# Patient Record
Sex: FEMALE | Race: WHITE | URBAN - METROPOLITAN AREA
[De-identification: names, ages, dates, MRNs, and addresses within clinical notes are randomized per-mention and may not be internally consistent; named-entity substitution may affect disease eponyms.]

---

## 2017-06-14 ENCOUNTER — APPOINTMENT (OUTPATIENT)
Dept: GENERAL RADIOLOGY | Facility: CLINIC | Age: 22
DRG: 639 | End: 2017-06-14
Attending: EMERGENCY MEDICINE
Payer: COMMERCIAL

## 2017-06-14 ENCOUNTER — HOSPITAL ENCOUNTER (INPATIENT)
Facility: CLINIC | Age: 22
LOS: 3 days | Discharge: HOME | DRG: 639 | End: 2017-06-17
Attending: EMERGENCY MEDICINE | Admitting: INTERNAL MEDICINE
Payer: COMMERCIAL

## 2017-06-14 DIAGNOSIS — E11.10 DKA (DIABETIC KETOACIDOSES): Primary | ICD-10-CM

## 2017-06-14 DIAGNOSIS — E13.10 DIABETIC KETOACIDOSIS WITHOUT COMA ASSOCIATED WITH OTHER SPECIFIED DIABETES MELLITUS (H): ICD-10-CM

## 2017-06-14 LAB
ALBUMIN SERPL-MCNC: 4.3 G/DL (ref 3.4–5)
ALP SERPL-CCNC: 220 U/L (ref 40–150)
ALT SERPL W P-5'-P-CCNC: 15 U/L (ref 0–50)
ANION GAP SERPL CALCULATED.3IONS-SCNC: 21 MMOL/L (ref 3–14)
ANION GAP SERPL CALCULATED.3IONS-SCNC: 22 MMOL/L (ref 3–14)
AST SERPL W P-5'-P-CCNC: 6 U/L (ref 0–45)
BASE DEFICIT BLDV-SCNC: 22 MMOL/L
BASOPHILS # BLD AUTO: 0.1 10E9/L (ref 0–0.2)
BASOPHILS NFR BLD AUTO: 0.8 %
BILIRUB DIRECT SERPL-MCNC: 0.1 MG/DL (ref 0–0.2)
BILIRUB SERPL-MCNC: 0.4 MG/DL (ref 0.2–1.3)
BUN SERPL-MCNC: 3 MG/DL (ref 7–30)
BUN SERPL-MCNC: 4 MG/DL (ref 7–30)
CALCIUM SERPL-MCNC: 7.9 MG/DL (ref 8.5–10.1)
CALCIUM SERPL-MCNC: 8.4 MG/DL (ref 8.5–10.1)
CHLORIDE SERPL-SCNC: 105 MMOL/L (ref 94–109)
CHLORIDE SERPL-SCNC: 116 MMOL/L (ref 94–109)
CO2 SERPL-SCNC: 4 MMOL/L (ref 20–32)
CO2 SERPL-SCNC: 7 MMOL/L (ref 20–32)
CREAT SERPL-MCNC: 0.43 MG/DL (ref 0.52–1.04)
CREAT SERPL-MCNC: 0.5 MG/DL (ref 0.52–1.04)
DIFFERENTIAL METHOD BLD: ABNORMAL
EOSINOPHIL # BLD AUTO: 0.1 10E9/L (ref 0–0.7)
EOSINOPHIL NFR BLD AUTO: 0.7 %
ERYTHROCYTE [DISTWIDTH] IN BLOOD BY AUTOMATED COUNT: 19.9 % (ref 10–15)
GFR SERPL CREATININE-BSD FRML MDRD: ABNORMAL ML/MIN/1.7M2
GFR SERPL CREATININE-BSD FRML MDRD: ABNORMAL ML/MIN/1.7M2
GLUCOSE BLDC GLUCOMTR-MCNC: 203 MG/DL (ref 70–99)
GLUCOSE BLDC GLUCOMTR-MCNC: 240 MG/DL (ref 70–99)
GLUCOSE BLDC GLUCOMTR-MCNC: 313 MG/DL (ref 70–99)
GLUCOSE BLDC GLUCOMTR-MCNC: 341 MG/DL (ref 70–99)
GLUCOSE BLDC GLUCOMTR-MCNC: 376 MG/DL (ref 70–99)
GLUCOSE BLDC GLUCOMTR-MCNC: 429 MG/DL (ref 70–99)
GLUCOSE BLDC GLUCOMTR-MCNC: 444 MG/DL (ref 70–99)
GLUCOSE SERPL-MCNC: 230 MG/DL (ref 70–99)
GLUCOSE SERPL-MCNC: 426 MG/DL (ref 70–99)
HBA1C MFR BLD: 14.1 % (ref 4.3–6)
HCG SERPL QL: NEGATIVE
HCO3 BLDV-SCNC: 7 MMOL/L (ref 21–28)
HCT VFR BLD AUTO: 45.6 % (ref 35–47)
HGB BLD-MCNC: 14.1 G/DL (ref 11.7–15.7)
IMM GRANULOCYTES # BLD: 0.1 10E9/L (ref 0–0.4)
IMM GRANULOCYTES NFR BLD: 0.7 %
KETONES BLD-SCNC: 5.4 MMOL/L (ref 0–0.6)
LIPASE SERPL-CCNC: 155 U/L (ref 73–393)
LYMPHOCYTES # BLD AUTO: 3.4 10E9/L (ref 0.8–5.3)
LYMPHOCYTES NFR BLD AUTO: 25.2 %
MAGNESIUM SERPL-MCNC: 2.2 MG/DL (ref 1.6–2.3)
MCH RBC QN AUTO: 22.7 PG (ref 26.5–33)
MCHC RBC AUTO-ENTMCNC: 30.9 G/DL (ref 31.5–36.5)
MCV RBC AUTO: 73 FL (ref 78–100)
MONOCYTES # BLD AUTO: 1.4 10E9/L (ref 0–1.3)
MONOCYTES NFR BLD AUTO: 10.2 %
NEUTROPHILS # BLD AUTO: 8.3 10E9/L (ref 1.6–8.3)
NEUTROPHILS NFR BLD AUTO: 62.4 %
NRBC # BLD AUTO: 0 10*3/UL
NRBC BLD AUTO-RTO: 0 /100
O2/TOTAL GAS SETTING VFR VENT: ABNORMAL %
OXYHGB MFR BLDV: 45 %
PCO2 BLDV: 23 MM HG (ref 40–50)
PH BLDV: 7.06 PH (ref 7.32–7.43)
PHOSPHATE SERPL-MCNC: 2 MG/DL (ref 2.5–4.5)
PLATELET # BLD AUTO: 343 10E9/L (ref 150–450)
PO2 BLDV: 25 MM HG (ref 25–47)
POTASSIUM SERPL-SCNC: 4.2 MMOL/L (ref 3.4–5.3)
POTASSIUM SERPL-SCNC: 4.2 MMOL/L (ref 3.4–5.3)
PROT SERPL-MCNC: 8.3 G/DL (ref 6.8–8.8)
RBC # BLD AUTO: 6.22 10E12/L (ref 3.8–5.2)
SODIUM SERPL-SCNC: 134 MMOL/L (ref 133–144)
SODIUM SERPL-SCNC: 141 MMOL/L (ref 133–144)
TSH SERPL DL<=0.005 MIU/L-ACNC: 1.7 MU/L (ref 0.4–4)
WBC # BLD AUTO: 13.4 10E9/L (ref 4–11)

## 2017-06-14 PROCEDURE — 99223 1ST HOSP IP/OBS HIGH 75: CPT | Mod: AI | Performed by: INTERNAL MEDICINE

## 2017-06-14 PROCEDURE — 82010 KETONE BODYS QUAN: CPT | Performed by: EMERGENCY MEDICINE

## 2017-06-14 PROCEDURE — 84100 ASSAY OF PHOSPHORUS: CPT | Performed by: EMERGENCY MEDICINE

## 2017-06-14 PROCEDURE — 83735 ASSAY OF MAGNESIUM: CPT | Performed by: EMERGENCY MEDICINE

## 2017-06-14 PROCEDURE — 96365 THER/PROPH/DIAG IV INF INIT: CPT

## 2017-06-14 PROCEDURE — 00000146 ZZHCL STATISTIC GLUCOSE BY METER IP

## 2017-06-14 PROCEDURE — 82805 BLOOD GASES W/O2 SATURATION: CPT | Performed by: EMERGENCY MEDICINE

## 2017-06-14 PROCEDURE — 25000128 H RX IP 250 OP 636: Performed by: INTERNAL MEDICINE

## 2017-06-14 PROCEDURE — 40000275 ZZH STATISTIC RCP TIME EA 10 MIN

## 2017-06-14 PROCEDURE — 25800025 ZZH RX 258: Performed by: INTERNAL MEDICINE

## 2017-06-14 PROCEDURE — 80048 BASIC METABOLIC PNL TOTAL CA: CPT | Performed by: EMERGENCY MEDICINE

## 2017-06-14 PROCEDURE — 84443 ASSAY THYROID STIM HORMONE: CPT | Performed by: EMERGENCY MEDICINE

## 2017-06-14 PROCEDURE — 85025 COMPLETE CBC W/AUTO DIFF WBC: CPT | Performed by: EMERGENCY MEDICINE

## 2017-06-14 PROCEDURE — 36416 COLLJ CAPILLARY BLOOD SPEC: CPT | Performed by: INTERNAL MEDICINE

## 2017-06-14 PROCEDURE — 96361 HYDRATE IV INFUSION ADD-ON: CPT

## 2017-06-14 PROCEDURE — 20000003 ZZH R&B ICU

## 2017-06-14 PROCEDURE — 71020 XR CHEST 2 VW: CPT

## 2017-06-14 PROCEDURE — 93005 ELECTROCARDIOGRAM TRACING: CPT

## 2017-06-14 PROCEDURE — 99207 ZZC CDG-CODE CATEGORY CHANGED: CPT | Performed by: INTERNAL MEDICINE

## 2017-06-14 PROCEDURE — 83690 ASSAY OF LIPASE: CPT | Performed by: EMERGENCY MEDICINE

## 2017-06-14 PROCEDURE — 82010 KETONE BODYS QUAN: CPT | Performed by: INTERNAL MEDICINE

## 2017-06-14 PROCEDURE — 84703 CHORIONIC GONADOTROPIN ASSAY: CPT | Performed by: EMERGENCY MEDICINE

## 2017-06-14 PROCEDURE — 87640 STAPH A DNA AMP PROBE: CPT | Performed by: INTERNAL MEDICINE

## 2017-06-14 PROCEDURE — 99285 EMERGENCY DEPT VISIT HI MDM: CPT | Mod: 25

## 2017-06-14 PROCEDURE — 25000131 ZZH RX MED GY IP 250 OP 636 PS 637: Performed by: EMERGENCY MEDICINE

## 2017-06-14 PROCEDURE — 83036 HEMOGLOBIN GLYCOSYLATED A1C: CPT | Performed by: EMERGENCY MEDICINE

## 2017-06-14 PROCEDURE — 82805 BLOOD GASES W/O2 SATURATION: CPT | Performed by: INTERNAL MEDICINE

## 2017-06-14 PROCEDURE — 25000128 H RX IP 250 OP 636: Performed by: EMERGENCY MEDICINE

## 2017-06-14 PROCEDURE — 36600 WITHDRAWAL OF ARTERIAL BLOOD: CPT

## 2017-06-14 PROCEDURE — 80048 BASIC METABOLIC PNL TOTAL CA: CPT | Performed by: INTERNAL MEDICINE

## 2017-06-14 PROCEDURE — 96376 TX/PRO/DX INJ SAME DRUG ADON: CPT

## 2017-06-14 PROCEDURE — 80076 HEPATIC FUNCTION PANEL: CPT | Performed by: EMERGENCY MEDICINE

## 2017-06-14 PROCEDURE — 25000125 ZZHC RX 250: Performed by: INTERNAL MEDICINE

## 2017-06-14 PROCEDURE — 25000132 ZZH RX MED GY IP 250 OP 250 PS 637: Performed by: INTERNAL MEDICINE

## 2017-06-14 PROCEDURE — 87641 MR-STAPH DNA AMP PROBE: CPT | Performed by: INTERNAL MEDICINE

## 2017-06-14 RX ORDER — ONDANSETRON 4 MG/1
4 TABLET, ORALLY DISINTEGRATING ORAL EVERY 6 HOURS PRN
Status: DISCONTINUED | OUTPATIENT
Start: 2017-06-14 | End: 2017-06-17 | Stop reason: HOSPADM

## 2017-06-14 RX ORDER — DEXTROSE MONOHYDRATE, SODIUM CHLORIDE, AND POTASSIUM CHLORIDE 50; 1.49; 4.5 G/1000ML; G/1000ML; G/1000ML
INJECTION, SOLUTION INTRAVENOUS CONTINUOUS
Status: DISCONTINUED | OUTPATIENT
Start: 2017-06-14 | End: 2017-06-16

## 2017-06-14 RX ORDER — POTASSIUM CHLORIDE 1.5 G/1.58G
20-40 POWDER, FOR SOLUTION ORAL
Status: DISCONTINUED | OUTPATIENT
Start: 2017-06-14 | End: 2017-06-17 | Stop reason: HOSPADM

## 2017-06-14 RX ORDER — PROCHLORPERAZINE 25 MG
25 SUPPOSITORY, RECTAL RECTAL EVERY 12 HOURS PRN
Status: DISCONTINUED | OUTPATIENT
Start: 2017-06-14 | End: 2017-06-17 | Stop reason: HOSPADM

## 2017-06-14 RX ORDER — NICOTINE POLACRILEX 4 MG
15-30 LOZENGE BUCCAL
Status: DISCONTINUED | OUTPATIENT
Start: 2017-06-14 | End: 2017-06-17 | Stop reason: HOSPADM

## 2017-06-14 RX ORDER — MAGNESIUM SULFATE HEPTAHYDRATE 40 MG/ML
4 INJECTION, SOLUTION INTRAVENOUS EVERY 4 HOURS PRN
Status: DISCONTINUED | OUTPATIENT
Start: 2017-06-14 | End: 2017-06-17 | Stop reason: HOSPADM

## 2017-06-14 RX ORDER — POTASSIUM CL/LIDO/0.9 % NACL 10MEQ/0.1L
10 INTRAVENOUS SOLUTION, PIGGYBACK (ML) INTRAVENOUS
Status: DISCONTINUED | OUTPATIENT
Start: 2017-06-14 | End: 2017-06-17 | Stop reason: HOSPADM

## 2017-06-14 RX ORDER — DEXTROSE MONOHYDRATE 25 G/50ML
25-50 INJECTION, SOLUTION INTRAVENOUS
Status: DISCONTINUED | OUTPATIENT
Start: 2017-06-14 | End: 2017-06-17 | Stop reason: HOSPADM

## 2017-06-14 RX ORDER — POTASSIUM CHLORIDE 1500 MG/1
20-40 TABLET, EXTENDED RELEASE ORAL
Status: DISCONTINUED | OUTPATIENT
Start: 2017-06-14 | End: 2017-06-17 | Stop reason: HOSPADM

## 2017-06-14 RX ORDER — PROCHLORPERAZINE MALEATE 5 MG
5-10 TABLET ORAL EVERY 6 HOURS PRN
Status: DISCONTINUED | OUTPATIENT
Start: 2017-06-14 | End: 2017-06-17 | Stop reason: HOSPADM

## 2017-06-14 RX ORDER — LORAZEPAM 0.5 MG/1
0.25 TABLET ORAL ONCE
Status: COMPLETED | OUTPATIENT
Start: 2017-06-14 | End: 2017-06-14

## 2017-06-14 RX ORDER — ONDANSETRON 2 MG/ML
4 INJECTION INTRAMUSCULAR; INTRAVENOUS EVERY 6 HOURS PRN
Status: DISCONTINUED | OUTPATIENT
Start: 2017-06-14 | End: 2017-06-17 | Stop reason: HOSPADM

## 2017-06-14 RX ORDER — POLYETHYLENE GLYCOL 3350 17 G/17G
17 POWDER, FOR SOLUTION ORAL DAILY PRN
Status: DISCONTINUED | OUTPATIENT
Start: 2017-06-14 | End: 2017-06-17 | Stop reason: HOSPADM

## 2017-06-14 RX ORDER — AMOXICILLIN 250 MG
1-2 CAPSULE ORAL 2 TIMES DAILY PRN
Status: DISCONTINUED | OUTPATIENT
Start: 2017-06-14 | End: 2017-06-17 | Stop reason: HOSPADM

## 2017-06-14 RX ORDER — SODIUM CHLORIDE AND POTASSIUM CHLORIDE 150; 450 MG/100ML; MG/100ML
INJECTION, SOLUTION INTRAVENOUS CONTINUOUS
Status: DISCONTINUED | OUTPATIENT
Start: 2017-06-14 | End: 2017-06-16

## 2017-06-14 RX ORDER — POTASSIUM CHLORIDE 7.45 MG/ML
10 INJECTION INTRAVENOUS
Status: DISCONTINUED | OUTPATIENT
Start: 2017-06-14 | End: 2017-06-17 | Stop reason: HOSPADM

## 2017-06-14 RX ORDER — NALOXONE HYDROCHLORIDE 0.4 MG/ML
.1-.4 INJECTION, SOLUTION INTRAMUSCULAR; INTRAVENOUS; SUBCUTANEOUS
Status: DISCONTINUED | OUTPATIENT
Start: 2017-06-14 | End: 2017-06-17 | Stop reason: HOSPADM

## 2017-06-14 RX ORDER — POTASSIUM CHLORIDE 29.8 MG/ML
20 INJECTION INTRAVENOUS
Status: DISCONTINUED | OUTPATIENT
Start: 2017-06-14 | End: 2017-06-17 | Stop reason: HOSPADM

## 2017-06-14 RX ORDER — ACETAMINOPHEN 325 MG/1
650 TABLET ORAL EVERY 4 HOURS PRN
Status: DISCONTINUED | OUTPATIENT
Start: 2017-06-14 | End: 2017-06-17 | Stop reason: HOSPADM

## 2017-06-14 RX ADMIN — SODIUM CHLORIDE 1000 ML: 9 INJECTION, SOLUTION INTRAVENOUS at 21:39

## 2017-06-14 RX ADMIN — SODIUM CHLORIDE: 9 INJECTION, SOLUTION INTRAVENOUS at 20:55

## 2017-06-14 RX ADMIN — SODIUM CHLORIDE 1000 ML: 9 INJECTION, SOLUTION INTRAVENOUS at 18:59

## 2017-06-14 RX ADMIN — POTASSIUM CHLORIDE, DEXTROSE MONOHYDRATE AND SODIUM CHLORIDE: 150; 5; 450 INJECTION, SOLUTION INTRAVENOUS at 23:11

## 2017-06-14 RX ADMIN — Medication 0.25 MG: at 23:12

## 2017-06-14 RX ADMIN — POTASSIUM PHOSPHATE, MONOBASIC AND POTASSIUM PHOSPHATE, DIBASIC 15 MMOL: 224; 236 INJECTION, SOLUTION, CONCENTRATE INTRAVENOUS at 23:14

## 2017-06-14 RX ADMIN — SODIUM CHLORIDE: 9 INJECTION, SOLUTION INTRAVENOUS at 20:10

## 2017-06-14 ASSESSMENT — ENCOUNTER SYMPTOMS
SHORTNESS OF BREATH: 1
NAUSEA: 1
VOMITING: 1
DIARRHEA: 0
ABDOMINAL PAIN: 0
COUGH: 0
FEVER: 0

## 2017-06-14 NOTE — IP AVS SNAPSHOT
[place] [address] [facility]    [address]    [facility] [address]    Phone:  [phone]    Fax:  [phone]                                       After Visit Summary   6/14/2017    Karely Jacobo    MRN: [id number]           After Visit Summary Signature Page     I have received my discharge instructions, and my questions have been answered. I have discussed any challenges I see with this plan with the nurse or doctor.    ..........................................................................................................................................  Patient/Patient Representative Signature      ..........................................................................................................................................  Patient Representative Print Name and Relationship to Patient    ..................................................               ................................................  Date                                            Time    ..........................................................................................................................................  Reviewed by Signature/Title    ...................................................              ..............................................  Date                                                            Time

## 2017-06-14 NOTE — IP AVS SNAPSHOT
MRN:[id number]                      After Visit Summary   6/14/2017    Karely Jacobo    MRN: [id number]           Thank you!     Thank you for choosing [facility] for your care. Our goal is always to provide you with excellent care. Hearing back from our patients is one way we can continue to improve our services. Please take a few minutes to complete the written survey that you may receive in the mail after you visit. If you would like to speak to someone directly about your visit please contact Patient Relations at [phone]. Thank you!          Patient Information     Date Of Birth          1995        Designated Caregiver       Most Recent Value    Caregiver    Will someone help with your care after discharge? no      About your hospital stay     You were admitted on:  June 14, 2017 You last received care in the:  [place] [address] [facility]    You were discharged on:  June 17, 2017       Who to Call     For medical emergencies, please call 911.  For non-urgent questions about your medical care, please call your primary care provider or clinic, None          Attending Provider     Provider Specialty    Emmanuel Santa MD Emergency Medicine    Bang, Evette Godinez MD Internal Medicine       Primary Care Provider    Physician No Ref-Primary      Follow-up Appointments     Follow-up and recommended labs and tests        F/U with your primary MD on Monday to Tuesday for diabetic check and referral to endocrinology  Check your blood sugars 3 times a day before meals and at bedtime  Try counting your carbs and keep a diary-bring your blood sugar measurements and your carb counting to your follow up appointment  If you start to feel that your blood sugar is low, check your level                  Additional Services     MED THERAPY MANAGE REFERRAL       Your provider has referred you to: **[facility] Scheduling (numerous locations) [phone]    [email or web address]    Reason for Referral: Hgb A1C 14.  New Diabetic    The [facility] department will contact you to schedule an appointment.  You may also schedule the appointment by calling [phone].  For [facility] [place] [facility] [place] patients, please call [phone] to confirm/schedule your appointment on the next business day.    This service is designed to help you get the most from your medications.  A specially trained Pharmacist will work closely with you and your providers to solve any questions, concerns, issues or problems related to your medications.    Please bring all of your prescription and non-prescription medications (such as vitamins, over-the-counter medications, and herbals) or a detailed medication list to your appointment.    If you have a glucose meter or other home monitoring information, please also bring this to your appointment (i.e. blood glucose log, blood pressure log, pain log, etc.).            NUTRITION REFERRAL       Your provider has referred you to: [facility] [facility] [phone]   [email or web address]    Please be aware that coverage of these services is subject to the terms and limitations of your health insurance plan.  Call member services at your health plan with any benefit or coverage questions.      Please bring the following with you to your appointment:    (1) This referral request  (2) Any documents given to you regarding this referral  (3) Any specific questions you have about diet and/or food choices                  Pending Results     Date and Time Order Name Status Description    6/15/2017 0001 Glutamic acid decarboxylase antibody In process             Statement of Approval     Ordered          06/17/17 2135  I have reviewed and agree with all the recommendations and orders detailed in this document.  EFFECTIVE NOW     Approved and  "electronically signed by:  Pauline Gamez MD             Admission Information     Date & Time Provider Department Dept. Phone    2017 Evette Bang MD [place] [address] [facility] [phone]      Your Vitals Were     Blood Pressure Pulse Temperature Respirations Height Weight    115/71 (BP Location: Left arm) 138 98.1  F (36.7  C) (Oral) 18 1.6 m (5' 3\") 66.3 kg (146 lb 1.6 oz)    Pulse Oximetry BMI (Body Mass Index)                100% 25.88 kg/m2          [employer] Information     [employer] lets you send messages to your doctor, view your test results, renew your prescriptions, schedule appointments and more. To sign up, go to [email or web address] . Click on \"Log in\" on the left side of the screen, which will take you to the Welcome page. Then click on \"Sign up Now\" on the right side of the page.     You will be asked to enter the access code listed below, as well as some personal information. Please follow the directions to create your username and password.     Your access code is: [email or web address]  Expires: 9/15/2017  5:58 PM     Your access code will  in 90 days. If you need help or a new code, please call your [place] clinic or [phone].        Care EveryWhere ID     This is your Care EveryWhere ID. This could be used by other organizations to access your [place] medical records  [id number]           Review of your medicines      START taking        Dose / Directions    * insulin aspart 100 UNIT/ML injection   Commonly known as:  NovoLOG FLEXPEN   Used for:  Diabetic ketoacidosis without coma associated with other specified diabetes mellitus (H)   Notes to Patient:  This is rapid-acting insulin.        Dose:  1-5 Units   Inject 1-5 Units Subcutaneous At Bedtime No insulin for BG < 200, 200-250 give 1 u, 251-300: 3 u; 301-350 4 u and notify clinic   Quantity:  30 mL   Refills:  1       * insulin aspart 100 UNIT/ML injection   Commonly known as:  NovoLOG PEN   Used for:  Diabetic " ketoacidosis without coma associated with other specified diabetes mellitus (H)   Notes to Patient:  This is rapid-acting insulin.        Dose:  1-7 Units   Inject 1-7 Units Subcutaneous 3 times daily (with meals) BS <150: no insulin 151-200: 1 u; 201-250: 3 units; 251-300: 4 u; 301-350: 6 u,  > 350 give 7 units and call clinic   Quantity:  3 mL   Refills:  0       insulin glargine 100 UNIT/ML injection   Commonly known as:  LANTUS SOLOSTAR   Used for:  Diabetic ketoacidosis without coma associated with other specified diabetes mellitus (H)   Notes to Patient:  This is long-acting insulin.        Dose:  12 Units   Inject 12 Units Subcutaneous At Bedtime   Quantity:  15 mL   Refills:  1       * Notice:  This list has 2 medication(s) that are the same as other medications prescribed for you. Read the directions carefully, and ask your doctor or other care provider to review them with you.         Where to get your medicines      These medications were sent to [facility] [address] [place] [id number]    Hours:  24-hours Phone:  [phone]     insulin aspart 100 UNIT/ML injection    insulin glargine 100 UNIT/ML injection         Some of these will need a paper prescription and others can be bought over the counter. Ask your nurse if you have questions.     Bring a paper prescription for each of these medications     insulin aspart 100 UNIT/ML injection                Protect others around you: Learn how to safely use, store and throw away your medicines at www.disposemymeds.org.             Medication List: This is a list of all your medications and when to take them. Check marks below indicate your daily home schedule. Keep this list as a reference.      Medications           Morning Afternoon Evening Bedtime As Needed    * insulin aspart 100 UNIT/ML injection   Commonly known as:  NovoLOG FLEXPEN   Inject 1-5 Units  Subcutaneous At Bedtime No insulin for BG < 200, 200-250 give 1 u, 251-300: 3 u; 301-350 4 u and notify clinic   Last time this was given:  7 Units on 6/17/2017  1:50 PM   Next Dose Due:  Check blood sugar tonight before bed and given insulin as indicated.   Notes to Patient:  This is rapid-acting insulin.                                * insulin aspart 100 UNIT/ML injection   Commonly known as:  NovoLOG PEN   Inject 1-7 Units Subcutaneous 3 times daily (with meals) BS <150: no insulin 151-200: 1 u; 201-250: 3 units; 251-300: 4 u; 301-350: 6 u,  > 350 give 7 units and call clinic   Last time this was given:  7 Units on 6/17/2017  1:50 PM   Next Dose Due:  Check blood sugar tonight before supper and give insulin as indicated.   Notes to Patient:  This is rapid-acting insulin.                                insulin glargine 100 UNIT/ML injection   Commonly known as:  LANTUS SOLOSTAR   Inject 12 Units Subcutaneous At Bedtime   Next Dose Due:  Give tonight at bedtime.   Notes to Patient:  This is long-acting insulin.                                * Notice:  This list has 2 medication(s) that are the same as other medications prescribed for you. Read the directions carefully, and ask your doctor or other care provider to review them with you.

## 2017-06-14 NOTE — ED NOTES
Patient comes in with complaints of SOB and nausea and vomiting. Patient was seen in clinic and was sent here for further evaluation for possible DKA per clinic. No complaints of pain.

## 2017-06-14 NOTE — ED PROVIDER NOTES
History     Chief Complaint:  Nausea, vomiting, and shortness of breath    HPI   Karely Jacobo is a 21 year old otherwise healthy male who presents to the emergency department today for evaluation of nausea, vomiting, and shortness of breath. The patient has had vomiting with associated shortness of breath and generalized weakness starting yesterday morning. Spouse notes the patient had 2 episodes of vomiting yesterday as well as one episode today. The patient feels dehydrated as she is unable to keep fluids and PO down. Prior to onset of symptoms, the patient felt normal. The patient visited her clinic and was recommended to come in for further evaluation for glucose and ketone in urine analysis. No personal history of diabetes. No fever or cough. No chest pain. No abdominal pain or diarrhea. Last menstrual period is , which was normal.     Allergies:  NKKA     Medications:    No daily medications.     Past Medical History:    History reviewed. No pertinent medical history.     Past Surgical History:        Family History:    Diabetes    Social History:  Marital Status:    Tobacco: Negative  Alcohol: Negative  Presents to the ED with spouse and son.      Review of Systems   Constitutional: Negative for fever.   Respiratory: Positive for shortness of breath (mild). Negative for cough.    Cardiovascular: Negative for chest pain.   Gastrointestinal: Positive for nausea and vomiting. Negative for abdominal pain and diarrhea.   All other systems reviewed and are negative.  10 point review of systems performed and is negative except as above and in HPI.  Physical Exam   First Vitals:  Patient Vitals for the past 24 hrs:   BP Temp Pulse Heart Rate Resp SpO2   17 127/87 - - - - 100 %   17 - - - - 30 100 %   17 (!) 113/98 - - 110 (!) 32 99 %   17 105/89 - - 101 30 100 %   17 102/87 - - 103 (!) 32 100 %   17 113/83 - - 106 (!) 32 100  %   17 (!) 84/46 - - 120 27 100 %   17 - - - 107 23 100 %   17 1907 (!) 126/94 - - - - 100 %   17 1843 (!) 131/104 98.7  F (37.1  C) 138 138 18 98 %      Physical Exam  Vital signs and nursing notes reviewed.     Constitutional: laying on gurney appears comfortable  HENT:  Dry oral mucosa.   Eyes: Conjunctivae are normal bilaterally. Pupils equal  Neck: normal range of motion  Cardiovascular: Tachycardic, regular rhythm, normal heart sounds.   Pulmonary/Chest:  Lungs are clear without rales, rhonchi, or wheezing. Tachypneic.  Abdominal: Soft. No distention. Bowel sounds are normal.  No significant reproducible abdominal pain.   Musculoskeletal: No joint swelling or edema.   Neurological: Alert and oriented. No focal weakness  Skin: Skin is warm and dry. No rash noted.   Psych: normal affect  Emergency Department Course   EC2017 19:08:05  Indication: rule out cardiac etiology  Findings:    Sinus tachycardia   Possible left atrial enlargement   Borderline ECG..   Ventricular rate: 120 bpm  OR interval: 148 ms  QRS duration: 74 ms  QT/QTc: 324/457 ms  R-Axis: 56  ECG read at 19:10 by Dr. Rai.    Laboratory:  CBC:  WBC 13.4, HGB 14.1,   BMP: Glucose 426, BUN 4, Creatinine  0.50, CO2 7, Anion 22 o/w WNL.   Hepatic panel: Bili Direct 0.1, Bili Total 0.4, Albumin 4.3, Protein Total 8.3, Alkphos 220, ALT 15, AST 6  Blood gas venous and oxyhgb: pH 7.06 PCO2 23 PO2 25 Bicarbonate 7 Oxyhemoglobin 45 Base 22.0   Ketone beta-hydrobutyrate: 5.4  HCG Qualitative Pregnancy: Negative.  Lipase: 155  TSH with free T4 reflex: 1.70  Glucose by meter (18:55:00): 429 (H)    Interventions:  18:59 Normal saline 1,000mL IV   20:10  Insulin 1 unit/1 mL in NS IV    Emergency Department Course:  Nursing notes and vitals reviewed.    18:47 I performed an exam of the patient as documented above.     18:56 Blood drawn. This was sent to the lab for further testing, results above.    18:59 A  peripheral IV was established.    19:08 EKG obtained in the ED, see results above.     19:44 Recheck patient. Findings and plan explained to the Patient and spouse who consents to admission.     20:01 Discussed the patient with Dr. Bang, who will admit the patient to a ICU bed for further monitoring, evaluation, and treatment.    The patient received the intervention(s) above.    Impression & Plan    Medical Decision Making:  Karely Jacobo is a 21 year old female who presents with symptoms of nausea and generalized weakness. She was referred from outpatient clinic due to noted glucose and ketone in her urine.  Patient is not known to have history of any diabetic problems and is otherwise healthy.  On examination, she appeared to be dehydrated and was tachypnic and tachycardic. Her labs showed findings consistent with DKA. She was started on IV fluids and as electrolytes are normal at this time, I did initiate an insulin drip. We will do Q15 blood sugar checks for the first hour to ensure that we do not drop her blood sugar too quickly. There is no indication she has an obvious underlying infectious etiology that would have triggered this. I reviewed the results with the patient and the reasons for admission. She and her  understand the plan. She will be admitted to the ICU for close monitoring and I spoke with Dr. Bang for admission who graciously accepts patient.     Critical Care time:  Was 40 minutes excluding procedures    Diagnosis:    ICD-10-CM   1. Diabetic ketoacidosis without coma associated with other specified diabetes mellitus (H) E13.10       Disposition:  Admitted to ICU bed under the care of Dr. Bang    Scribe Disclosure:  GINGER, Ruap Crandall, am serving as a scribe at 6:47 PM on 6/14/2017 to document services personally performed by Emmanuel Santa MD, based on my observations and the provider's statements to me.  Rupa Crandall  6/14/2017   [facility] EMERGENCY DEPARTMENT        Emmanuel Santa MD  06/14/17 2558

## 2017-06-15 LAB
ANION GAP SERPL CALCULATED.3IONS-SCNC: 10 MMOL/L (ref 3–14)
ANION GAP SERPL CALCULATED.3IONS-SCNC: 12 MMOL/L (ref 3–14)
ANION GAP SERPL CALCULATED.3IONS-SCNC: 13 MMOL/L (ref 3–14)
BASE DEFICIT BLDA-SCNC: NORMAL MMOL/L
BASE DEFICIT BLDV-SCNC: 15.4 MMOL/L
BASE DEFICIT BLDV-SCNC: 20.9 MMOL/L
BASE EXCESS BLDA CALC-SCNC: NORMAL MMOL/L
BUN SERPL-MCNC: 2 MG/DL (ref 7–30)
BUN SERPL-MCNC: 3 MG/DL (ref 7–30)
BUN SERPL-MCNC: 3 MG/DL (ref 7–30)
CALCIUM SERPL-MCNC: 7.7 MG/DL (ref 8.5–10.1)
CALCIUM SERPL-MCNC: 7.9 MG/DL (ref 8.5–10.1)
CALCIUM SERPL-MCNC: 8.1 MG/DL (ref 8.5–10.1)
CHLORIDE SERPL-SCNC: 112 MMOL/L (ref 94–109)
CHLORIDE SERPL-SCNC: 117 MMOL/L (ref 94–109)
CHLORIDE SERPL-SCNC: 120 MMOL/L (ref 94–109)
CO2 SERPL-SCNC: 14 MMOL/L (ref 20–32)
CO2 SERPL-SCNC: 16 MMOL/L (ref 20–32)
CO2 SERPL-SCNC: 8 MMOL/L (ref 20–32)
CREAT SERPL-MCNC: 0.34 MG/DL (ref 0.52–1.04)
CREAT SERPL-MCNC: 0.36 MG/DL (ref 0.52–1.04)
CREAT SERPL-MCNC: 0.46 MG/DL (ref 0.52–1.04)
ERYTHROCYTE [DISTWIDTH] IN BLOOD BY AUTOMATED COUNT: 19 % (ref 10–15)
GFR SERPL CREATININE-BSD FRML MDRD: ABNORMAL ML/MIN/1.7M2
GLUCOSE BLDC GLUCOMTR-MCNC: 113 MG/DL (ref 70–99)
GLUCOSE BLDC GLUCOMTR-MCNC: 126 MG/DL (ref 70–99)
GLUCOSE BLDC GLUCOMTR-MCNC: 136 MG/DL (ref 70–99)
GLUCOSE BLDC GLUCOMTR-MCNC: 140 MG/DL (ref 70–99)
GLUCOSE BLDC GLUCOMTR-MCNC: 146 MG/DL (ref 70–99)
GLUCOSE BLDC GLUCOMTR-MCNC: 148 MG/DL (ref 70–99)
GLUCOSE BLDC GLUCOMTR-MCNC: 154 MG/DL (ref 70–99)
GLUCOSE BLDC GLUCOMTR-MCNC: 154 MG/DL (ref 70–99)
GLUCOSE BLDC GLUCOMTR-MCNC: 157 MG/DL (ref 70–99)
GLUCOSE BLDC GLUCOMTR-MCNC: 165 MG/DL (ref 70–99)
GLUCOSE BLDC GLUCOMTR-MCNC: 176 MG/DL (ref 70–99)
GLUCOSE BLDC GLUCOMTR-MCNC: 177 MG/DL (ref 70–99)
GLUCOSE BLDC GLUCOMTR-MCNC: 182 MG/DL (ref 70–99)
GLUCOSE BLDC GLUCOMTR-MCNC: 188 MG/DL (ref 70–99)
GLUCOSE BLDC GLUCOMTR-MCNC: 189 MG/DL (ref 70–99)
GLUCOSE BLDC GLUCOMTR-MCNC: 189 MG/DL (ref 70–99)
GLUCOSE BLDC GLUCOMTR-MCNC: 203 MG/DL (ref 70–99)
GLUCOSE BLDC GLUCOMTR-MCNC: 206 MG/DL (ref 70–99)
GLUCOSE BLDC GLUCOMTR-MCNC: 206 MG/DL (ref 70–99)
GLUCOSE BLDC GLUCOMTR-MCNC: 212 MG/DL (ref 70–99)
GLUCOSE BLDC GLUCOMTR-MCNC: 221 MG/DL (ref 70–99)
GLUCOSE BLDC GLUCOMTR-MCNC: 263 MG/DL (ref 70–99)
GLUCOSE BLDC GLUCOMTR-MCNC: 264 MG/DL (ref 70–99)
GLUCOSE SERPL-MCNC: 115 MG/DL (ref 70–99)
GLUCOSE SERPL-MCNC: 212 MG/DL (ref 70–99)
GLUCOSE SERPL-MCNC: 219 MG/DL (ref 70–99)
HCO3 BLD-SCNC: NORMAL MMOL/L (ref 21–28)
HCO3 BLDV-SCNC: 11 MMOL/L (ref 21–28)
HCO3 BLDV-SCNC: 7 MMOL/L (ref 21–28)
HCT VFR BLD AUTO: 36.5 % (ref 35–47)
HGB BLD-MCNC: 11.8 G/DL (ref 11.7–15.7)
INTERPRETATION ECG - MUSE: NORMAL
KETONES BLD-SCNC: 3.2 MMOL/L (ref 0–0.6)
KETONES BLD-SCNC: 3.9 MMOL/L (ref 0–0.6)
MAGNESIUM SERPL-MCNC: 1.8 MG/DL (ref 1.6–2.3)
MCH RBC QN AUTO: 22.9 PG (ref 26.5–33)
MCHC RBC AUTO-ENTMCNC: 32.3 G/DL (ref 31.5–36.5)
MCV RBC AUTO: 71 FL (ref 78–100)
MRSA DNA SPEC QL NAA+PROBE: NORMAL
O2/TOTAL GAS SETTING VFR VENT: 2 %
O2/TOTAL GAS SETTING VFR VENT: ABNORMAL %
O2/TOTAL GAS SETTING VFR VENT: NORMAL %
OXYHGB MFR BLD: NORMAL % (ref 92–100)
OXYHGB MFR BLDV: 55 %
OXYHGB MFR BLDV: 73 %
PCO2 BLD: NORMAL MM HG (ref 35–45)
PCO2 BLDV: 23 MM HG (ref 40–50)
PCO2 BLDV: 29 MM HG (ref 40–50)
PH BLD: NORMAL PH (ref 7.35–7.45)
PH BLDV: 7.1 PH (ref 7.32–7.43)
PH BLDV: 7.2 PH (ref 7.32–7.43)
PHOSPHATE SERPL-MCNC: 1 MG/DL (ref 2.5–4.5)
PHOSPHATE SERPL-MCNC: 1.4 MG/DL (ref 2.5–4.5)
PLATELET # BLD AUTO: 248 10E9/L (ref 150–450)
PO2 BLD: NORMAL MM HG (ref 80–105)
PO2 BLDV: 28 MM HG (ref 25–47)
PO2 BLDV: 35 MM HG (ref 25–47)
POTASSIUM SERPL-SCNC: 3.1 MMOL/L (ref 3.4–5.3)
POTASSIUM SERPL-SCNC: 3.5 MMOL/L (ref 3.4–5.3)
POTASSIUM SERPL-SCNC: 3.6 MMOL/L (ref 3.4–5.3)
POTASSIUM SERPL-SCNC: 4.3 MMOL/L (ref 3.4–5.3)
RBC # BLD AUTO: 5.16 10E12/L (ref 3.8–5.2)
SODIUM SERPL-SCNC: 138 MMOL/L (ref 133–144)
SODIUM SERPL-SCNC: 141 MMOL/L (ref 133–144)
SODIUM SERPL-SCNC: 143 MMOL/L (ref 133–144)
SPECIMEN SOURCE: NORMAL
WBC # BLD AUTO: 10.7 10E9/L (ref 4–11)

## 2017-06-15 PROCEDURE — 36415 COLL VENOUS BLD VENIPUNCTURE: CPT | Performed by: INTERNAL MEDICINE

## 2017-06-15 PROCEDURE — 85027 COMPLETE CBC AUTOMATED: CPT | Performed by: INTERNAL MEDICINE

## 2017-06-15 PROCEDURE — 84132 ASSAY OF SERUM POTASSIUM: CPT | Performed by: INTERNAL MEDICINE

## 2017-06-15 PROCEDURE — 83516 IMMUNOASSAY NONANTIBODY: CPT | Performed by: INTERNAL MEDICINE

## 2017-06-15 PROCEDURE — 84100 ASSAY OF PHOSPHORUS: CPT | Performed by: INTERNAL MEDICINE

## 2017-06-15 PROCEDURE — 00000146 ZZHCL STATISTIC GLUCOSE BY METER IP

## 2017-06-15 PROCEDURE — 25000131 ZZH RX MED GY IP 250 OP 636 PS 637: Performed by: INTERNAL MEDICINE

## 2017-06-15 PROCEDURE — 80048 BASIC METABOLIC PNL TOTAL CA: CPT | Performed by: INTERNAL MEDICINE

## 2017-06-15 PROCEDURE — 83735 ASSAY OF MAGNESIUM: CPT | Performed by: INTERNAL MEDICINE

## 2017-06-15 PROCEDURE — 25000125 ZZHC RX 250

## 2017-06-15 PROCEDURE — 25000125 ZZHC RX 250: Performed by: INTERNAL MEDICINE

## 2017-06-15 PROCEDURE — 25000128 H RX IP 250 OP 636: Performed by: INTERNAL MEDICINE

## 2017-06-15 PROCEDURE — 25000132 ZZH RX MED GY IP 250 OP 250 PS 637: Performed by: INTERNAL MEDICINE

## 2017-06-15 PROCEDURE — 25800025 ZZH RX 258: Performed by: INTERNAL MEDICINE

## 2017-06-15 PROCEDURE — 99231 SBSQ HOSP IP/OBS SF/LOW 25: CPT | Performed by: INTERNAL MEDICINE

## 2017-06-15 PROCEDURE — 20000003 ZZH R&B ICU

## 2017-06-15 PROCEDURE — 82805 BLOOD GASES W/O2 SATURATION: CPT | Performed by: INTERNAL MEDICINE

## 2017-06-15 PROCEDURE — 82010 KETONE BODYS QUAN: CPT | Performed by: INTERNAL MEDICINE

## 2017-06-15 RX ORDER — LIDOCAINE 40 MG/G
CREAM TOPICAL
Status: COMPLETED
Start: 2017-06-15 | End: 2017-06-15

## 2017-06-15 RX ADMIN — LIDOCAINE: 40 CREAM TOPICAL at 22:47

## 2017-06-15 RX ADMIN — ONDANSETRON 4 MG: 2 INJECTION INTRAMUSCULAR; INTRAVENOUS at 09:34

## 2017-06-15 RX ADMIN — SODIUM CHLORIDE 6 UNITS/HR: 9 INJECTION, SOLUTION INTRAVENOUS at 11:31

## 2017-06-15 RX ADMIN — POTASSIUM PHOSPHATE, MONOBASIC AND POTASSIUM PHOSPHATE, DIBASIC 20 MMOL: 224; 236 INJECTION, SOLUTION, CONCENTRATE INTRAVENOUS at 22:37

## 2017-06-15 RX ADMIN — POTASSIUM CHLORIDE 40 MEQ: 1500 TABLET, EXTENDED RELEASE ORAL at 16:10

## 2017-06-15 RX ADMIN — SODIUM CHLORIDE 2 UNITS/HR: 9 INJECTION, SOLUTION INTRAVENOUS at 05:29

## 2017-06-15 RX ADMIN — POTASSIUM PHOSPHATE, MONOBASIC AND POTASSIUM PHOSPHATE, DIBASIC 25 MMOL: 224; 236 INJECTION, SOLUTION, CONCENTRATE INTRAVENOUS at 06:50

## 2017-06-15 RX ADMIN — POTASSIUM CHLORIDE, DEXTROSE MONOHYDRATE AND SODIUM CHLORIDE: 150; 5; 450 INJECTION, SOLUTION INTRAVENOUS at 23:13

## 2017-06-15 RX ADMIN — SODIUM CHLORIDE 8 UNITS/HR: 9 INJECTION, SOLUTION INTRAVENOUS at 19:43

## 2017-06-15 RX ADMIN — ACETAMINOPHEN 650 MG: 325 TABLET, FILM COATED ORAL at 16:43

## 2017-06-15 RX ADMIN — SODIUM CHLORIDE 7 UNITS/HR: 9 INJECTION, SOLUTION INTRAVENOUS at 01:43

## 2017-06-15 RX ADMIN — POTASSIUM CHLORIDE, DEXTROSE MONOHYDRATE AND SODIUM CHLORIDE: 150; 5; 450 INJECTION, SOLUTION INTRAVENOUS at 15:09

## 2017-06-15 RX ADMIN — SODIUM CHLORIDE 10 UNITS/HR: 9 INJECTION, SOLUTION INTRAVENOUS at 22:47

## 2017-06-15 RX ADMIN — SODIUM CHLORIDE 10 UNITS/HR: 9 INJECTION, SOLUTION INTRAVENOUS at 15:12

## 2017-06-15 RX ADMIN — POTASSIUM CHLORIDE, DEXTROSE MONOHYDRATE AND SODIUM CHLORIDE: 150; 5; 450 INJECTION, SOLUTION INTRAVENOUS at 06:56

## 2017-06-15 NOTE — PROGRESS NOTES
services set up for 2-3 today and 9-10 tomorrow for education regarding new DX of Diabetes.  Nutrition consult and CC for resources, meter has been ordered for patient, will follow for needs.    Rae Capps RN BSN CTS  [facility]   Care Management Coordinator  [email or web address]   [phone]

## 2017-06-15 NOTE — PROGRESS NOTES
"[facility]  Hospitalist Progress Note  Pauline Gamez MD 06/15/2017    Reason for Stay (Diagnosis): dka         Assessment and Plan:      Summary of Stay: Karely Jacobo is a 21 year old female with a history of gestational htn  admitted on 6/14/2017 with c/o n/v and found to be in DKA which is a new diagnosis.  Her presentation was fairly remarkable with CO2 at 4, pH 7.06, and hgb A1c 14.1.  Her gap is closing but her bicarb is still only at 16 despite 20 hours of insulin gtt.  She appears withdrawn and does not appear to want to face the diagnosis  Problem List:   1. DKA:  Rather persistent and I suspect some level of chronicity in light of hgb A1c level.  She may have a combination T 1 and 2.  Hard to now. She clearly needs endo at discharge.  For now, wait for her gap to close and then transition to sq insulin.  Bedside teaching already taking place.  KELSY guerrero obtained and still pending  2. N/v:  2/2 DKA-improving  3. Leukocytosis:  No e/o active infection  4. FEN:  Replace electrolytes per protocol  DVT Prophylaxis: Pneumatic Compression Devices  Code Status: Full Code  Discharge Dispo: home  Estimated Disch Date / # of Days until Disch: 1-3 days        Interval History (Subjective):      Basically turns away from me and answers with only 1 word answers.  No more n/v, po intake poor. No cp or sob                  Physical Exam:      Last Vital Signs:  /78  Pulse 138  Temp 98.1  F (36.7  C) (Oral)  Resp 16  Ht 1.6 m (5' 3\")  Wt 62.1 kg (136 lb 14.5 oz)  SpO2 100%  BMI 24.25 kg/m2    I/O: looks fairly even although no UO documented today    Laying in bed, withjdrawn,nad, looks stated age head nc/at sclera clear lungs ctab nl effort RRR no m/r/g no le edema skin w/d no c/c abd s/nt/nd affect flat alert estrada            Medications:      All current medications were reviewed with changes reflected in problem list.         Data:      All new lab and imaging data was reviewed.   Labs:    Recent " Labs  Lab 06/15/17  1512      POTASSIUM 3.1*   CHLORIDE 112*   CO2 16*   ANIONGAP 10   *   BUN 2*   CR 0.36*   GFRESTIMATED >90Non  GFR Calc   GFRESTBLACK >90African American GFR Calc   KEYUR 7.7*       Recent Labs  Lab 06/15/17  0525   WBC 10.7   HGB 11.8   HCT 36.5   MCV 71*         Imaging:

## 2017-06-15 NOTE — ED NOTES
Patient BS levels are being checked frequently d/t B.S dropping quickly. MD was made aware as well as nurse receiving patient. Per MD change insulin drip to 4ml/hr. Drip was changed and Loraine bonilla was notified of this change.

## 2017-06-15 NOTE — PROGRESS NOTES
TeleICU    A 22 yo woman with a new dx of DM in DKA.  Hospitalist Service will manage F/E/N and will call if she deteriorates.  Regino Babcock MD, FACS

## 2017-06-15 NOTE — H&P
[facility]  Hospitalist Admission Note  Name: Karely Jacobo    MRN: [id number]  YOB: 1995    Age: 21 year old  Date of admission: 6/14/2017  Primary care provider: No primary care provider on file.    Chief Complaint:  Nausea/Emesis    Assessment and Plan:     Karely Jacobo is a 21 year old female with past medical history of gestational HTN (resolved after birth of her child) who was sent to the clinic from the ER due to two days of nausea and emesis and was found to have significant glucose and ketones in her urine.  In the ER she was found to have DKA with new onset diabetes with associated metabolic acidosis due to DKA.    1. DKA: New diagnosis if diabetes.  Presented with two days of nausea and emesis.  In clinic found to have tachypnea as well as significant glucose and ketones in her urine.  In the ER she had AGMA with ketones and a glucose over 400.  She was given IVF and started on insulin drip.  Will continue insulin drip with monitoring of BMP and ketones.  - Insulin drip  - Monitor and replace electrolytes as needed  - Check KELSY antibody  - HgbA1C  - Nutrition consult for diabetes diet    2. Nausea/Emesis: Due to DKA.  Not having abdominal pain.  Should improve with treatment of DKA. Lipase not elevated and benign abdominal exam. Antiemetics available as needed.    3. AGMA: Due to DKA.  VBG with pH of 7.09.  She is tachypneic which is respiratory compensation for her acidosis.  CXR was clear.  Will repeat VBG in a few hours after starting insulin drip.  Monitor BMP as above.    4. Urinary Frequency: Due to DKA.  UA at clinic not consistent with infection.    5. Leukocytosis: Mild leukocytosis.  No history of infection.  Likely due to stress response and dehydration from DKA.  Repeat in AM.  No antibiotics indicated.    DVT Prophylaxis: Pneumatic Compression Devices  Code Status: Full Code  Discharge Dispo: Admit to ICU  Estimated Disch Date / # of Days until Disch: expect 2  midnight stay.    Case discussed with the intensivist on call.      History of Present Illness:  Karely Jacobo is a 21 year old female with past medical history of gestational HTN (resolved after birth of her child) who was sent to the clinic from the ER due to two days of nausea and emesis and was found to have significant glucose and ketones in her urine.  In the ER she was found to have DKA with new onset diabetes with associated metabolic acidosis due to DKA.  History was obtained through patient interview, discussion with her , chart review and discussion with Dr. Santa in the ER.    The patient states she has had two days of nausea and emesis.  She has been unable to keep any food down as the smell of anything induced emesis.  Has also been drinking very little.  She was seen in clinic today and was tachypneic and found to have significant glucose and ketones in her urine.  She was sent to the ER for further evaluation.      She states that she has not had fevers, chills, abdominal pain, diarrhea, constipation, CP.  She does feel very weak and has had intermittent feelings of SOB but no cough.  She does have frequent urination but no hematuria or dysuria.    Her father was diagnosed with DM about 5 years ago.  She has never been told that she has elevated glucose.  She does have a 2 year old child and did have a C section.  She had HTN during pregnancy but this resolved after pregnancy.       Past Medical History:  Past Medical History:   Diagnosis Date     Gestational hypertension     resolved after childbirth     Past Surgical History:  Past Surgical History:   Procedure Laterality Date      SECTION       Social History:  Social History   Substance Use Topics     Smoking status: Not on file     Smokeless tobacco: Not on file     Alcohol use Not on file     Social History     Social History Narrative   No alcohol or tobacco.  Patient is  and has a 2 year old child.    Family  History:  Family History   Problem Relation Age of Onset     DIABETES Father      Allergies:  No Known Allergies  Medications:  Patient does not take medications PTA.    Review of Systems:  A Comprehensive greater than 10 system review of systems was carried out.  Pertinent positives and negatives are noted above.  Otherwise negative for contributory information.     Physical Exam:  Blood pressure (!) 126/94, pulse 138, temperature 98.7  F (37.1  C), resp. rate 23, SpO2 100 %.  Wt Readings from Last 1 Encounters:   No data found for Wt     Exam:   General: Alert, awake, no acute distress.  HEENT: NC/AT, eyes anicteric and without injection, EOMI, face symmetric.  Dentition WNL, MMM.  Cardiac: Tachycardic, regular rhythm, normal S1, S2.  No murmurs/g/r.  No LE edema  Pulmonary: Normal chest rise, normal work of breathing, tachypneic.  Lungs CTAB without crackles or wheezing  Abdomen: soft, non-tender, non-distended.  Normoactive BS.  No guarding or rebound tenderness.  Extremities: no deformities.  Warm, well perfused.  Skin: no rashes or lesions noted.  Warm and Dry.  Neuro: No focal deficits noted.  Speech clear.  Coordination and strength grossly normal.  Psych: Appropriate affect. Alert and oriented x3    Data Reviewed Today:  EKG:  Sinus tachycardia but no acute ischemic changes  Imaging:  Results for orders placed or performed during the hospital encounter of 06/14/17   XR Chest 2 Views    Narrative    XR CHEST 2 VW 6/14/2017 8:02 PM     HISTORY: Diabetic Ketoacidosis      Impression    IMPRESSION: Negative exam.    ALLIE SHIN MD     Labs:    Recent Labs  Lab 06/14/17  1856   PHV 7.06*   PO2V 25   PCO2V 23*   HCO3V 7*       Recent Labs  Lab 06/14/17  1856   WBC 13.4*   HGB 14.1   HCT 45.6   MCV 73*          Recent Labs  Lab 06/14/17  1856      POTASSIUM 4.2   CHLORIDE 105   CO2 7*   ANIONGAP 22*   *   BUN 4*   CR 0.50*   GFRESTIMATED >90Non  GFR Calc   GFRESTBLACK  >90African American GFR Calc   KEYUR 8.4*   PROTTOTAL 8.3   ALBUMIN 4.3   BILITOTAL 0.4   ALKPHOS 220*   AST 6   ALT 15       Recent Labs  Lab 06/14/17 2003 06/14/17 1856 06/14/17 1855   GLC  --  426*  --    *  --  429*       Recent Labs  Lab 06/14/17 1856   LIPASE 155       Recent Labs  Lab 06/14/17 1856   TSH 1.70       Evette Bang MD  Hospitalist  [facility]    Evaluation and management time exclusive of procedures was 30 minutes critical care time including: urgent examination and evaluation of the patient, discussion of the patient's condition with other physicians and members of the care team, reviewing data and chart related to the patient, discussion of patient's condition with the family and time utilizing the EMR for documentation of this patient's care.

## 2017-06-15 NOTE — PROVIDER NOTIFICATION
Paged admitting MD at [phone] for the following: Critical lab CO2 is 5, pt was hyperventilating upon admission. Tele Show ST with peaked P's.pt is very scared and anxious, still tachycardia, can we get Ativan?? please address. Thanks  Addendum: MD called, Ativan ordered, ABG's ordered.

## 2017-06-15 NOTE — CONSULTS
NUTRITION EDUCATION      REASON FOR NUTRITION CONSULT:  Provider Order  -  Nutrition Education for new diagnosis of DM    ASSESSMENT:  Patient with new diagnosis, overwhelmed with new information  Hgb A1c of 14.1  Clear liquid diet order    FOLLOW UP:   Will instruct patient prior to discharge - PharmD currently providing teaching.   Also entered OP nutrition education consult - MD to sign if in agreement    MIGNON Campos  3rd floor/ICU: [phone]  All other floors: [phone]  Weekend/holiday: [phone]  Office: [phone]

## 2017-06-15 NOTE — PHARMACY
"Reviewed diabetic education focusing on \"survival skills\"  Current A1C 14.1. Goal A1C <7  Pt will demonstrate ability to correctly calculate, prepare & self-administer insulin  Pt will check BG three times daily before meals and at bedtime  Pt will keep a BG log and take to clinic visit  Pt will call physician if his BG > 250 for several readings (Pt can also check urine for ketones)   Reminded pt that illness/sickness could worsen blood glucose.   Pt knows how to recognize and treat signs of hypoglycemia  Pt will F/U with PCP within 7days/weeks     "

## 2017-06-15 NOTE — PLAN OF CARE
Problem: Hyperglycemia, Persistent (Adult)  Goal: Signs and Symptoms of Listed Potential Problems Will be Absent or Manageable (Hyperglycemia, Persistent)  Signs and symptoms of listed potential problems will be absent or manageable by discharge/transition of care (reference Hyperglycemia, Persistent (Adult) CPG).   Outcome: Improving  ICU End of Shift Summary.  For vital signs and complete assessments, please see documentation flowsheets.      Pertinent assessments: VSS Pt very anxious with blood draws,needs lots of reassurance,encouragement  Major Shift Events: Insulin gtt titrated to blood sugars,up to algorithm 4 ,down to 2U/hr  Plan (Upcoming Events): Wean insulin as able and transition to SQ. Will need lots of education   Discharge/Transfer Needs: TBD     Bedside Shift Report Completed :   Bedside Safety Check Completed:

## 2017-06-15 NOTE — PHARMACY-ADMISSION MEDICATION HISTORY
Admission medication history interview status for this patient is complete. See [facility] admission navigator for allergy information, prior to admission medications and immunization status.     Medication history interview source(s):Patient  Medication history resources (including written lists, pill bottles, clinic record):None  Primary [facility]    Changes made to PTA medication list:  Added: None    Deleted: None  Changed: None    Actions taken by pharmacist (provider contacted, etc):None     Additional medication history information:Patient states that they do not take any OTC, vitamins, supplements, or herbals, or prescription medications. Patient states that this is the first episode of high blood glucose.    Medication reconciliation/reorder completed by provider prior to medication history? No    Do you take OTC medications (eg tylenol, ibuprofen, fish oil, eye/ear drops, etc)? N    For patients on insulin therapy: No      Prior to Admission medications    Not on File

## 2017-06-15 NOTE — PLAN OF CARE
Problem: Goal Outcome Summary  Goal: Goal Outcome Summary  Outcome: No Change  Assume of this patient for 2 1/2 hours. Pt admitted from ED Due to DKA. Arrived on floor via cart, pt was hyperventilating with her breathing and crying without of control. O2 applied and reassurance given. Pt A&Ox4. VSS. O2 Sats WDL. Insulin gtt and fluids given per Orders, SBA to bathroom. Tele; ST with peaked P's waves, MD notified. Phosphorus was replaced, mag and potassium WDL. Ativan given for anxiety with positive outcomes. CO2 level of 5 reported to MD. ABG's ordered. RT Following. Plan to continue to monitor BS, continue IVF, monitor labs, Nutrition consult in am. Pleasant, cooperative. POC reviwed with pt and family, questions and concerns answered.

## 2017-06-16 LAB
ANION GAP SERPL CALCULATED.3IONS-SCNC: 7 MMOL/L (ref 3–14)
BASOPHILS # BLD AUTO: 0 10E9/L (ref 0–0.2)
BASOPHILS NFR BLD AUTO: 0.6 %
BUN SERPL-MCNC: 1 MG/DL (ref 7–30)
CALCIUM SERPL-MCNC: 8.2 MG/DL (ref 8.5–10.1)
CHLORIDE SERPL-SCNC: 114 MMOL/L (ref 94–109)
CO2 SERPL-SCNC: 19 MMOL/L (ref 20–32)
CREAT SERPL-MCNC: 0.43 MG/DL (ref 0.52–1.04)
DIFFERENTIAL METHOD BLD: ABNORMAL
EOSINOPHIL # BLD AUTO: 0.3 10E9/L (ref 0–0.7)
EOSINOPHIL NFR BLD AUTO: 3.8 %
ERYTHROCYTE [DISTWIDTH] IN BLOOD BY AUTOMATED COUNT: 19.2 % (ref 10–15)
GFR SERPL CREATININE-BSD FRML MDRD: ABNORMAL ML/MIN/1.7M2
GLUCOSE BLDC GLUCOMTR-MCNC: 100 MG/DL (ref 70–99)
GLUCOSE BLDC GLUCOMTR-MCNC: 110 MG/DL (ref 70–99)
GLUCOSE BLDC GLUCOMTR-MCNC: 112 MG/DL (ref 70–99)
GLUCOSE BLDC GLUCOMTR-MCNC: 113 MG/DL (ref 70–99)
GLUCOSE BLDC GLUCOMTR-MCNC: 120 MG/DL (ref 70–99)
GLUCOSE BLDC GLUCOMTR-MCNC: 122 MG/DL (ref 70–99)
GLUCOSE BLDC GLUCOMTR-MCNC: 127 MG/DL (ref 70–99)
GLUCOSE BLDC GLUCOMTR-MCNC: 154 MG/DL (ref 70–99)
GLUCOSE BLDC GLUCOMTR-MCNC: 164 MG/DL (ref 70–99)
GLUCOSE BLDC GLUCOMTR-MCNC: 175 MG/DL (ref 70–99)
GLUCOSE BLDC GLUCOMTR-MCNC: 211 MG/DL (ref 70–99)
GLUCOSE BLDC GLUCOMTR-MCNC: 234 MG/DL (ref 70–99)
GLUCOSE BLDC GLUCOMTR-MCNC: 245 MG/DL (ref 70–99)
GLUCOSE BLDC GLUCOMTR-MCNC: 318 MG/DL (ref 70–99)
GLUCOSE SERPL-MCNC: 97 MG/DL (ref 70–99)
HCT VFR BLD AUTO: 33.6 % (ref 35–47)
HGB BLD-MCNC: 11.2 G/DL (ref 11.7–15.7)
IMM GRANULOCYTES # BLD: 0 10E9/L (ref 0–0.4)
IMM GRANULOCYTES NFR BLD: 0.3 %
KETONES BLD-SCNC: 4.6 MMOL/L (ref 0–0.6)
LYMPHOCYTES # BLD AUTO: 2.9 10E9/L (ref 0.8–5.3)
LYMPHOCYTES NFR BLD AUTO: 40.9 %
MAGNESIUM SERPL-MCNC: 1.6 MG/DL (ref 1.6–2.3)
MCH RBC QN AUTO: 23.1 PG (ref 26.5–33)
MCHC RBC AUTO-ENTMCNC: 33.3 G/DL (ref 31.5–36.5)
MCV RBC AUTO: 69 FL (ref 78–100)
MONOCYTES # BLD AUTO: 0.8 10E9/L (ref 0–1.3)
MONOCYTES NFR BLD AUTO: 11.3 %
NEUTROPHILS # BLD AUTO: 3 10E9/L (ref 1.6–8.3)
NEUTROPHILS NFR BLD AUTO: 43.1 %
NRBC # BLD AUTO: 0 10*3/UL
NRBC BLD AUTO-RTO: 0 /100
PHOSPHATE SERPL-MCNC: 2.9 MG/DL (ref 2.5–4.5)
PLATELET # BLD AUTO: 215 10E9/L (ref 150–450)
POTASSIUM SERPL-SCNC: 3.1 MMOL/L (ref 3.4–5.3)
POTASSIUM SERPL-SCNC: 4 MMOL/L (ref 3.4–5.3)
RBC # BLD AUTO: 4.85 10E12/L (ref 3.8–5.2)
SODIUM SERPL-SCNC: 140 MMOL/L (ref 133–144)
WBC # BLD AUTO: 7.1 10E9/L (ref 4–11)

## 2017-06-16 PROCEDURE — 25000131 ZZH RX MED GY IP 250 OP 636 PS 637: Performed by: INTERNAL MEDICINE

## 2017-06-16 PROCEDURE — 84132 ASSAY OF SERUM POTASSIUM: CPT | Performed by: INTERNAL MEDICINE

## 2017-06-16 PROCEDURE — 25000132 ZZH RX MED GY IP 250 OP 250 PS 637: Performed by: INTERNAL MEDICINE

## 2017-06-16 PROCEDURE — 12000000 ZZH R&B MED SURG/OB

## 2017-06-16 PROCEDURE — 83735 ASSAY OF MAGNESIUM: CPT | Performed by: INTERNAL MEDICINE

## 2017-06-16 PROCEDURE — 36415 COLL VENOUS BLD VENIPUNCTURE: CPT | Performed by: INTERNAL MEDICINE

## 2017-06-16 PROCEDURE — 99231 SBSQ HOSP IP/OBS SF/LOW 25: CPT | Performed by: INTERNAL MEDICINE

## 2017-06-16 PROCEDURE — 25000125 ZZHC RX 250: Performed by: INTERNAL MEDICINE

## 2017-06-16 PROCEDURE — 00000146 ZZHCL STATISTIC GLUCOSE BY METER IP

## 2017-06-16 PROCEDURE — 85025 COMPLETE CBC W/AUTO DIFF WBC: CPT | Performed by: INTERNAL MEDICINE

## 2017-06-16 PROCEDURE — 99207 ZZC CDG-MDM COMPONENT: MEETS LOW - DOWN CODED: CPT | Performed by: INTERNAL MEDICINE

## 2017-06-16 PROCEDURE — 25000128 H RX IP 250 OP 636: Performed by: INTERNAL MEDICINE

## 2017-06-16 PROCEDURE — 80048 BASIC METABOLIC PNL TOTAL CA: CPT | Performed by: INTERNAL MEDICINE

## 2017-06-16 PROCEDURE — 84100 ASSAY OF PHOSPHORUS: CPT | Performed by: INTERNAL MEDICINE

## 2017-06-16 RX ADMIN — POTASSIUM CHLORIDE 20 MEQ: 1500 TABLET, EXTENDED RELEASE ORAL at 11:44

## 2017-06-16 RX ADMIN — SODIUM CHLORIDE 12 UNITS/HR: 9 INJECTION, SOLUTION INTRAVENOUS at 01:10

## 2017-06-16 RX ADMIN — POTASSIUM CHLORIDE 40 MEQ: 1500 TABLET, EXTENDED RELEASE ORAL at 09:26

## 2017-06-16 RX ADMIN — INSULIN GLARGINE 8 UNITS: 100 INJECTION, SOLUTION SUBCUTANEOUS at 10:28

## 2017-06-16 RX ADMIN — SODIUM CHLORIDE 2 UNITS/HR: 9 INJECTION, SOLUTION INTRAVENOUS at 07:28

## 2017-06-16 RX ADMIN — INSULIN ASPART 2 UNITS: 100 INJECTION, SOLUTION INTRAVENOUS; SUBCUTANEOUS at 14:09

## 2017-06-16 NOTE — PROGRESS NOTES
[facility]  Hospitalist Progress Note  Pauline Gamez MD 06/16/2017    Reason for Stay (Diagnosis): dka         Assessment and Plan:      Summary of Stay: Karely Jacobo is a 21 year old female with a history of gestational htn  admitted on 6/14/2017 with c/o n/v and found to be in DKA which is a new diagnosis.  Her presentation was fairly remarkable with CO2 at 4, pH 7.06, and hgb A1c 14.1.  Her gap is closed but her bicarb wass still only at 16 despite 20 hours of insulin gtt so left on for another 20 hours.  Bicarb still on the low side at 19 but ok to stop drip 6/16/17 and transition to sq insulin.  She appears much more engaged in her diagnosis but per RN is deathly afraid of needles, may end up having to have the  do her injections if becomes an issue.   Problem List:   1. DKA:  Rather persistent and I suspect some level of chronicity in light of hgb A1c level.  She may have a combination T 1 and 2.  Hard to now. She clearly needs endo at discharge.  For now, wait for her gap to close and then transition to sq insulin.  Bedside teaching already taking place.  KELSY guerrero obtained and still pending.  D/c insulin gtt today, requirements estimated based on 0.3 u/kg is approximately 18 u. Should be split 2/3 long acting and 1/3 short acting = 12 u/6u.  For now will start out with 8 u of glargine, ISS.  On recheck this afternoon, blood sugars in the 200 range, therefore will increase am glargine for tomorrow to 10 units, increase ISS to high res  2. N/v:  2/2 DKA-resolved  3. Leukocytosis:  No e/o active infection  4. FEN:  Replace electrolytes per protocol  DVT Prophylaxis: Pneumatic Compression Devices  Code Status: Full Code  Discharge Dispo: home  Estimated Disch Date / # of Days until Disch: 1-3 days-change to med overflow        Interval History (Subjective):      Feeling better today, denies any cp or sob po intake is good no n/v. Much more engaged, discussed impact of diagnosis on behavioral  "changes                  Physical Exam:      Last Vital Signs:  /70  Pulse 138  Temp 98.3  F (36.8  C) (Oral)  Resp 18  Ht 1.6 m (5' 3\")  Wt 64.5 kg (142 lb 3.2 oz)  SpO2 100%  BMI 25.19 kg/m2    I/O: looks fairly even although no UO documented today    Laying in bed, withjdrawn,nad, looks stated age head nc/at sclera clear lungs ctab nl effort RRR no m/r/g no le edema skin w/d no c/c abd s/nt/nd affect flat alert estrada            Medications:      All current medications were reviewed with changes reflected in problem list.         Data:      All new lab and imaging data was reviewed.   Labs:    Recent Labs  Lab 06/16/17  1520 06/16/17  0702   NA  --  140   POTASSIUM 4.0 3.1*   CHLORIDE  --  114*   CO2  --  19*   ANIONGAP  --  7   GLC  --  97   BUN  --  1*   CR  --  0.43*   GFRESTIMATED  --  >90Non  GFR Calc   GFRESTBLACK  --  >90African American GFR Calc   KEYUR  --  8.2*       Recent Labs  Lab 06/16/17  0702   WBC 7.1   HGB 11.2*   HCT 33.6*   MCV 69*         Imaging:         "

## 2017-06-16 NOTE — PLAN OF CARE
Problem: Hyperglycemia, Persistent (Adult)  Goal: Signs and Symptoms of Listed Potential Problems Will be Absent or Manageable (Hyperglycemia, Persistent)  Signs and symptoms of listed potential problems will be absent or manageable by discharge/transition of care (reference Hyperglycemia, Persistent (Adult) CPG).   Outcome: Improving  ICU End of Shift Summary.  For vital signs and complete assessments, please see documentation flowsheets.      Pertinent assessments: VSS Afebrile, ambulating to bathroom with family assist  Major Shift Events: 2nd IV restarted for Phos replacement, pt remains very anxious with needle sticks,Phos replaced Insulin gtt titrated to blood sugars,on algorhithm 4                                                                                                                                                         Bedside Shift Report Completed :   Bedside Safety Check Completed:

## 2017-06-16 NOTE — PLAN OF CARE
Problem: Individualization  Goal: Patient Preferences  Outcome: No Change  ICU End of Shift Summary.  For vital signs and complete assessments, please see documentation flowsheets.      Pertinent assessments: Denies pain.  Eating meals from home.  Education provided on rice and carbs.  Coverage with ISS.  OOB steady gait SBA from family.  Fear of needles- encouragement given.    Major Shift Events: Off insulin infusion.   Plan (Upcoming Events): Education.  Discharge/Transfer Needs: Education     Bedside Shift Report Completed :   Bedside Safety Check Completed:

## 2017-06-16 NOTE — PLAN OF CARE
Problem: Goal Outcome Summary  Goal: Goal Outcome Summary  Outcome: Improving  Patient remains on Insulin gtt. Alg 4. CO2 16 with last check.  KPhos and KCL replacement this shift.  DM1 Teaching initiated.  Pt and friend viewed DM DVD and pt and spouse should view again.  Has received DM Booklet and Hypo and Hyperglycemia Handouts.  Had patient and family work with new accucheck machine and lancet injector. Much teaching and handling of equipment still needed. As pt is still on Insulin gtt, we have not done Insulin injections yet.

## 2017-06-16 NOTE — CONSULTS
"CLINICAL NUTRITION SERVICES  -  ASSESSMENT NOTE       REASON FOR ASSESSMENT  Karely Jacobo is a 21 year old female seen by Registered Dietitian for Admission Nutrition Risk Screen - New/uncontrolled diabetes and Provider Order - \"new diagnosis of DM\".  - Refer to RD brief note completed yesterday.        NUTRITION HISTORY  - Information obtained from patient and family in room.  Informed there would be interpretor from 9-10 but no interpretor currently in room.  Patient and family denied use of interpretor/interpretor phone during assessment.    - New onset DM.    - N/V x 2 days PTA.  Minimal oral intake during this time.    - Hard to obtain diet specifics given patient and family seeming to struggle with new diagnosis, though all foods consumed are cultural ().   - Patient is a lactovegetarian.  Breakfast: Pudding with fruit.  Lunch: Rice + vegetables.  Dinner: Whole grain + vegetables.  Does not consume pop or fruit juices, does add sugar to tea.  - NKFA.      CURRENT NUTRITION ORDERS  Diet Order:     Clears    Current Intake/Tolerance:  Tolerating current diet.       PHYSICAL FINDINGS  Observed  No nutritionally pertinent, see below  Obtained from Chart/Interdisciplinary Team  No nutritionally pertinent    ANTHROPOMETRICS  Height: 5' 3\"  Weight: 64.5 kg (142#)  Body mass index is 25.19 kg/(m^2).  Weight Status:  Overweight BMI 25-29.9  IBW: 52.3 kg (115#)  % IBW: 123%  Weight History:  Wt Readings from Last 10 Encounters:   06/16/17 64.5 kg (142 lb 3.2 oz)   - Patient denies recent wt changes.      LABS  Labs reviewed:  - HgbA1C 6/14/2017 - 14.1%    MEDICATIONS  Medications reviewed:  - Insulin gtt    Dosing Weight 55.4 kg - adjusted weight    ASSESSED NUTRITION NEEDS PER APPROVED PRACTICE GUIDELINES:  Estimated Energy Needs: 5189-3266 kcals (25-30 Kcal/Kg)  Justification: maintenance  Estimated Protein Needs: 55-66 grams protein (1-1.2 g pro/Kg)  Justification: maintenance  Estimated Fluid Needs: 4402-3074 " mL (1 mL/Kcal)  Justification: maintenance and per provider pending fluid status    MALNUTRITION:  % Weight Loss:  None noted  % Intake:  Decreased intake does not meet criteria for malnutrition   Subcutaneous Fat Loss:  None observed  Muscle Loss:  None observed  Fluid Retention:  None noted    Malnutrition Diagnosis: Patient does not meet two of the above criteria necessary for diagnosing malnutrition    NUTRITION DIAGNOSIS:  Food and nutrition-related knowledge deficit related to lack of exposure to nutrition education as evidenced by new DM diagnosis.       NUTRITION INTERVENTIONS  Recommendations / Nutrition Prescription  Further nutrition-related interventions pending decisions in insulin regimen (currently transitioning from insulin gtt).  If CHO counting, recommend regular diet order with minimization of added sugars/empty calorie sources.      Highly recommend outpatient RD/CDE referral (already entered).       Implementation  Nutrition education: Provided education on DM nutrition therapy, per MD, likely combination DMI and DMII:    Assessed learning needs, learning preferences, and willingness to learn    Nutrition Education (Content):  a) Provided handout DM nutrition therapy  b) Discussed foods which contain CHO, the importance of portion sizes, and need to limit added sugars/empty calorie sources.    Nutrition Education (Application):  a) Discussed eating habits and recommended alternative food choices.  b) Encouraged portion control and limiting of added sugars.     Patient not able to verbalize understanding.  Seeming overwhelmed, will require follow-up as above.    Anticipate fair compliance with RD follow-up.    Diet Education - refer to Education Flowsheet    Collaboration and Referral of Nutrition care: Discussed POC and education with MD, will discuss with team during rounds.  Discussed with PharmD.       Nutrition Goals  Patient to name at least 2 food sources which contain  CHOs.      MONITORING AND EVALUATION:  Progress towards goals will be monitored and evaluated per protocol and Practice Guidelines        Mi Oakley RD, LD  Clinical Dietitian  3rd floor/ICU: [phone]  All other floors: [phone]  Weekend/holiday: [phone]

## 2017-06-17 VITALS
HEIGHT: 63 IN | RESPIRATION RATE: 18 BRPM | OXYGEN SATURATION: 100 % | SYSTOLIC BLOOD PRESSURE: 115 MMHG | TEMPERATURE: 98.1 F | DIASTOLIC BLOOD PRESSURE: 71 MMHG | HEART RATE: 138 BPM | WEIGHT: 146.1 LBS | BODY MASS INDEX: 25.89 KG/M2

## 2017-06-17 LAB
ANION GAP SERPL CALCULATED.3IONS-SCNC: 8 MMOL/L (ref 3–14)
BASOPHILS # BLD AUTO: 0.1 10E9/L (ref 0–0.2)
BASOPHILS NFR BLD AUTO: 0.7 %
BUN SERPL-MCNC: 2 MG/DL (ref 7–30)
CALCIUM SERPL-MCNC: 8.1 MG/DL (ref 8.5–10.1)
CHLORIDE SERPL-SCNC: 108 MMOL/L (ref 94–109)
CO2 SERPL-SCNC: 23 MMOL/L (ref 20–32)
CREAT SERPL-MCNC: 0.39 MG/DL (ref 0.52–1.04)
DIFFERENTIAL METHOD BLD: ABNORMAL
EOSINOPHIL # BLD AUTO: 0.3 10E9/L (ref 0–0.7)
EOSINOPHIL NFR BLD AUTO: 3.7 %
ERYTHROCYTE [DISTWIDTH] IN BLOOD BY AUTOMATED COUNT: 19.8 % (ref 10–15)
GFR SERPL CREATININE-BSD FRML MDRD: ABNORMAL ML/MIN/1.7M2
GLUCOSE BLDC GLUCOMTR-MCNC: 385 MG/DL (ref 70–99)
GLUCOSE SERPL-MCNC: 274 MG/DL (ref 70–99)
HCT VFR BLD AUTO: 35.5 % (ref 35–47)
HGB BLD-MCNC: 11.7 G/DL (ref 11.7–15.7)
IMM GRANULOCYTES # BLD: 0 10E9/L (ref 0–0.4)
IMM GRANULOCYTES NFR BLD: 0.3 %
LYMPHOCYTES # BLD AUTO: 3.6 10E9/L (ref 0.8–5.3)
LYMPHOCYTES NFR BLD AUTO: 48.6 %
MAGNESIUM SERPL-MCNC: 2 MG/DL (ref 1.6–2.3)
MCH RBC QN AUTO: 23 PG (ref 26.5–33)
MCHC RBC AUTO-ENTMCNC: 33 G/DL (ref 31.5–36.5)
MCV RBC AUTO: 70 FL (ref 78–100)
MONOCYTES # BLD AUTO: 0.7 10E9/L (ref 0–1.3)
MONOCYTES NFR BLD AUTO: 9.7 %
NEUTROPHILS # BLD AUTO: 2.8 10E9/L (ref 1.6–8.3)
NEUTROPHILS NFR BLD AUTO: 37 %
NRBC # BLD AUTO: 0 10*3/UL
NRBC BLD AUTO-RTO: 0 /100
PHOSPHATE SERPL-MCNC: 3 MG/DL (ref 2.5–4.5)
PLATELET # BLD AUTO: 222 10E9/L (ref 150–450)
POTASSIUM SERPL-SCNC: 3.5 MMOL/L (ref 3.4–5.3)
RBC # BLD AUTO: 5.08 10E12/L (ref 3.8–5.2)
SODIUM SERPL-SCNC: 139 MMOL/L (ref 133–144)
WBC # BLD AUTO: 7.5 10E9/L (ref 4–11)

## 2017-06-17 PROCEDURE — 85025 COMPLETE CBC W/AUTO DIFF WBC: CPT | Performed by: INTERNAL MEDICINE

## 2017-06-17 PROCEDURE — 99238 HOSP IP/OBS DSCHRG MGMT 30/<: CPT | Performed by: INTERNAL MEDICINE

## 2017-06-17 PROCEDURE — 83735 ASSAY OF MAGNESIUM: CPT | Performed by: INTERNAL MEDICINE

## 2017-06-17 PROCEDURE — 36415 COLL VENOUS BLD VENIPUNCTURE: CPT | Performed by: INTERNAL MEDICINE

## 2017-06-17 PROCEDURE — 80048 BASIC METABOLIC PNL TOTAL CA: CPT | Performed by: INTERNAL MEDICINE

## 2017-06-17 PROCEDURE — 84100 ASSAY OF PHOSPHORUS: CPT | Performed by: INTERNAL MEDICINE

## 2017-06-17 PROCEDURE — 25000131 ZZH RX MED GY IP 250 OP 636 PS 637: Performed by: INTERNAL MEDICINE

## 2017-06-17 PROCEDURE — 00000146 ZZHCL STATISTIC GLUCOSE BY METER IP

## 2017-06-17 RX ADMIN — INSULIN GLARGINE 10 UNITS: 100 INJECTION, SOLUTION SUBCUTANEOUS at 10:37

## 2017-06-17 NOTE — PLAN OF CARE
Problem: Goal Outcome Summary  Goal: Goal Outcome Summary  Outcome: No Change  Ambulatory Status:  Pt up independently.  VS: Tachycardia  Pain:  denies  Resp: LS clear  GI:  denies nausea.  Mod cho diet.  BS +.  Passing flatus.  Last BM 6/16  Labs:  this shift, 7 units of sliding scale given, MD paged to see if we should give additional insulin, no new orders entered.  Disposition: TBD

## 2017-06-17 NOTE — PLAN OF CARE
Problem: Goal Outcome Summary  Goal: Goal Outcome Summary  Outcome: No Change  [place]: A & O x 4.   VS: VSS  Resp: WDL  GI: WDL  : WDL  Pain: Denies  Transfer: Up independently.  Walking in hallways.   IV:  PIV x 2 WDL  Diet:  Mod Cho.   bringing food in from home.  Tolerating PO foods and fluids well.   Plan:TBD.   Pt met with dietitian today and received education.  Pt educated on insulin administration using an insulin pen, was able to correctly administer insulin per self.  Education provided to  and sister as well.  Pt hoping to discharge home tonight around 6-7 pm.

## 2017-06-17 NOTE — PROGRESS NOTES
CLINICAL NUTRITION SERVICES BRIEF NOTE    See RD note from 6/16 for full nutrition assessment.      available for more in-depth education session this morning:    Assessed learning needs, learning preferences, and willingness to learn    Nutrition Education (Content):  a) Provided handout   a. Carbohydrate Counting for Vegetarians with Diabetes   b) Discussed   a. Incorporating more snacks througout the day to maintain steady blood glucose  b. Explained that amount of CHO in various types of rice (white or brown) and milk (skim, 1% or buttermilk) are similar.     Nutrition Education (Application):  a) Discussed eating habits and recommended alternative food choices    Patient verbalizes understanding of diet    Anticipate good compliance    Diet Education - refer to Education Flowsheet    Lyudmila Tai, OMAR, LD

## 2017-06-17 NOTE — DISCHARGE SUMMARY
Discharge Summary  Hospitalist Service    Karely Jacobo MRN# [id number]   YOB: 1995 Age: 21 year old     Date of Admission:  6/14/2017  Date of Discharge:  6/17/2017  Admitting Physician:  Evette Bang MD  Discharge Physician: Pauline Gamez MD  Discharging Service: Hospitalist Service                Discharge Diagnoses/Problem Oriented Hospital Course (Providers):    Karely Jacobo was admitted on 6/14/2017 by Evette Bang MD and I would refer you to their history and physical.  The following problems were addressed during her hospitalization:    New diabetes complicated by DKA  N/v 2/2 DKA  Leukocytosis-transient  hypokalemia         Code Status:      Full Code        Brief Hospital Stay Summary Sent Home With Patient in AVS:       Summary of Stay: Karely Jacobo is a 21 year old female with a history of gestational htn  admitted on 6/14/2017 with c/o n/v and found to be in DKA which is a new diagnosis.  Her presentation was fairly remarkable with CO2 at 4, pH 7.06, and hgb A1c 14.1.  Her gap is closed but her bicarb wass still only at 16 despite 20 hours of insulin gtt so left on for another 20 hours.  Bicarb still on the low side at 19 but ok to stop drip 6/16/17 and transition to sq insulin.  She appears much more engaged in her diagnosis but per RN is deathly afraid of needles, may end up having to have the  do her injections if becomes an issue.   Problem List:   1. DKA:  Rather persistent and I suspect some level of chronicity in light of hgb A1c level.  She may have a combination T 1 and 2.  Hard to now. She clearly needs endo at discharge.  She has been transitioned to sq insulin.  Bedside teaching already taking place and per discussion with RN, has been actively participating in testing blood sugars and giving insulin,  and sister are also engaged, appears safe for discharge from administration standpoint.  KELSY yolanda obtained and still pending.  Requirements estimated  based on 0.3 u/kg is approximately 18 u. Should be split 2/3 long acting and 1/3 short acting = 12 u/6u.  On 6/15 gave glargine 8 u, and blood sugars high t/o the day, increased to 10 u day of discharge and still inadequate, so will increase to 12 u at discharge, discharge with ISS, and have asked that she start counting carbohydrates with each meal and to keep a diary of it to bring to her appointment early next week  2. N/v:  2/2 DKA-resolved  3. Leukocytosis:  No e/o active infection  4. FEN:  Replaced electrolytes per protocol  DVT Prophylaxis: Pneumatic Compression Devices  Code Status: Full Code  Discharge Dispo: home         Important Results:      As noted below         Pending Results:        Unresulted Labs Ordered in the Past 30 Days of this Admission     Date and Time Order Name Status Description    6/15/2017 0001 Glutamic acid decarboxylase antibody In process             Discharge Instructions and Follow-Up:      Follow-up Appointments     Follow-up and recommended labs and tests        F/U with your primary MD on Monday to Tuesday for diabetic check and   referral to endocrinology  Check your blood sugars 3 times a day before meals and at bedtime  Try counting your carbs and keep a diary-bring your blood sugar   measurements and your carb counting to your follow up appointment  If you start to feel that your blood sugar is low, check your level                      Discharge Disposition:      Discharged to home         Discharge Medications:        Current Discharge Medication List      START taking these medications    Details   insulin glargine (LANTUS SOLOSTAR) 100 UNIT/ML injection Inject 12 Units Subcutaneous At Bedtime  Qty: 15 mL, Refills: 1    Associated Diagnoses: Diabetic ketoacidosis without coma associated with other specified diabetes mellitus (H)      !! insulin aspart (NOVOLOG FLEXPEN) 100 UNIT/ML injection Inject 1-5 Units Subcutaneous At Bedtime No insulin for BG < 200, 200-250 give  "1 u, 251-300: 3 u; 301-350 4 u and notify clinic  Qty: 30 mL, Refills: 1    Associated Diagnoses: Diabetic ketoacidosis without coma associated with other specified diabetes mellitus (H)      !! insulin aspart (NOVOLOG PEN) 100 UNIT/ML injection Inject 1-7 Units Subcutaneous 3 times daily (with meals) BS <150: no insulin 151-200: 1 u; 201-250: 3 units; 251-300: 4 u; 301-350: 6 u,  > 350 give 7 units and call clinic  Qty: 3 mL, Refills: 0    Associated Diagnoses: Diabetic ketoacidosis without coma associated with other specified diabetes mellitus (H)       !! - Potential duplicate medications found. Please discuss with provider.            Allergies:       No Known Allergies        Consultations This Hospital Stay:      No consultations were requested during this admission         Condition and Physical on Discharge:      Discharge condition: Stable   Vitals: Blood pressure 115/71, pulse 138, temperature 98.1  F (36.7  C), temperature source Oral, resp. rate 18, height 1.6 m (5' 3\"), weight 66.3 kg (146 lb 1.6 oz), SpO2 100 %.     Constitutional: Pleasant nad looks stated age head nc/at sclera clear   Lungs: ctab nl effort    Cardiovascular: RRR no m/r/g no le edema   Abdomen: S/nt/nd   Skin: W/d no c/c   Other: Affect appropriate alert and oriented          Discharge Time:      Less than 30 minutes.        Image Results From This Hospital Stay (For Non-EPIC Providers):        Results for orders placed or performed during the hospital encounter of 06/14/17   XR Chest 2 Views    Narrative    XR CHEST 2 VW 6/14/2017 8:02 PM     HISTORY: Diabetic Ketoacidosis      Impression    IMPRESSION: Negative exam.    ALLIE SHIN MD           Most Recent Lab Results In EPIC (For Non-EPIC Providers):    Most Recent 3 CBC's:  Recent Labs   Lab Test  06/17/17   0735  06/16/17   0702  06/15/17   0525   WBC  7.5  7.1  10.7   HGB  11.7  11.2*  11.8   MCV  70*  69*  71*   PLT  222  215  248      Most Recent 3 BMP's:  Recent Labs   Lab " Test  06/17/17   0735  06/16/17   1520  06/16/17   0702   06/15/17   1512   NA  139   --   140   --   138   POTASSIUM  3.5  4.0  3.1*   < >  3.1*   CHLORIDE  108   --   114*   --   112*   CO2  23   --   19*   --   16*   BUN  2*   --   1*   --   2*   CR  0.39*   --   0.43*   --   0.36*   ANIONGAP  8   --   7   --   10   KEYUR  8.1*   --   8.2*   --   7.7*   GLC  274*   --   97   --   219*    < > = values in this interval not displayed.     Most Recent 3 Troponin's:No lab results found.  Most Recent 3 INR's:No lab results found.  Most Recent 2 LFT's:  Recent Labs   Lab Test  06/14/17 1856   AST  6   ALT  15   ALKPHOS  220*   BILITOTAL  0.4     Most Recent Cholesterol Panel:No lab results found.  Most Recent 6 Bacteria Isolates From Any Culture (See EPIC Reports for Culture Details):No lab results found.  Most Recent TSH, T4 and HgbA1c:   Recent Labs   Lab Test  06/14/17 1856   TSH  1.70   A1C  14.1*

## 2017-06-17 NOTE — PLAN OF CARE
Problem: Goal Outcome Summary  Goal: Goal Outcome Summary  Outcome: No Change  Pt transferred from ICU around 1945. A&O, uses call light appropriately. Up independently, Tier A activity level. VSS, afebrile + sats maintained on RA. Denies pain. Mod CHO diet. , SSI.

## 2017-06-17 NOTE — PROGRESS NOTES
Pt discharged home with family to transport. With help of  phone reviewed AVS + home medication schedule, all questions answered. Pt and  voiced understanding of need to  prescriptions this evening at outside pharmacy. Pt vocalized understanding of medications to be administered this evening and follow up needed with primary care physician. Sent with all personal belongings.

## 2017-06-18 LAB — GAD65 AB SER IA-ACNC: 155.4

## 2017-06-20 ENCOUNTER — TELEPHONE (OUTPATIENT)
Dept: PHARMACY | Facility: OTHER | Age: 22
End: 2017-06-20

## 2017-06-20 NOTE — TELEPHONE ENCOUNTER
MTM referral from: Transitions of Care (recent hospital discharge or ED visit)    MTM referral outreach attempt #1 on June 20, 2017 at 1:35 PM      Outcome: Patient is not interested at this time because they are not a [facility] patient, will route to MTM Pharmacist/Provider as an FYI. Thank you for the referral.     Viky Berman MTM Coordinator

## 2017-06-26 ENCOUNTER — OFFICE VISIT (OUTPATIENT)
Dept: PEDIATRICS | Facility: CLINIC | Age: 22
End: 2017-06-26
Payer: COMMERCIAL

## 2017-06-26 VITALS
OXYGEN SATURATION: 100 % | TEMPERATURE: 98.1 F | DIASTOLIC BLOOD PRESSURE: 60 MMHG | WEIGHT: 147.5 LBS | SYSTOLIC BLOOD PRESSURE: 100 MMHG | HEART RATE: 76 BPM | BODY MASS INDEX: 26.13 KG/M2 | HEIGHT: 63 IN

## 2017-06-26 DIAGNOSIS — E10.65 TYPE 1 DIABETES MELLITUS WITH HYPERGLYCEMIA (H): Primary | ICD-10-CM

## 2017-06-26 DIAGNOSIS — N92.1 METRORRHAGIA: ICD-10-CM

## 2017-06-26 PROCEDURE — 99204 OFFICE O/P NEW MOD 45 MIN: CPT | Performed by: INTERNAL MEDICINE

## 2017-06-26 RX ORDER — PEN NEEDLE, DIABETIC 31 GX5/16"
NEEDLE, DISPOSABLE MISCELLANEOUS
Refills: 1 | COMMUNITY
Start: 2017-06-17

## 2017-06-26 NOTE — MR AVS SNAPSHOT
After Visit Summary   6/26/2017    Karely Jacobo    MRN: [id number]           Patient Information     Date Of Birth          1995        Visit Information        Provider Department      6/26/2017 5:45 PM Vianney, Hedy Roberson MD; LANGUAGE [facility]        Today's Diagnoses     Type 1 diabetes mellitus with hyperglycemia (H)    -  1    Metrorrhagia          Care Instructions    1. Increase Lantus (glargine) insulin to 14 units in evening  2. Continue novolog insulin as taking  3. You will be contacted to set up an appointment with the diabetes educator  4. Schedule an appointment with Dr. Ballard (Endocrinologist) - [phone]  5. Follow-up with me in 1 month if not going to be able to see endocrinologist for 2 or 3 months            Follow-ups after your visit        Additional Services     DIABETES EDUCATOR REFERRAL       DIABETES SELF MANAGEMENT TRAINING (DSMT)      Your provider has referred you to Diabetes Education: FMG: Diabetes Education - [facility] [phone]   [email or web address]    Type of training and number of hours: New Diagnosis: Initial group DSMT - 10 hours.      Medicare covers: 10 hours of initial DSMT in 12 month period from the time of first visit, plus 2 hours of follow-up DSMT annually, and additional hours as requested for insulin training.    Diabetes Type: Type 1             Diabetes Co-Morbidities: none               A1C Goal:  <7.0       A1C is: Lab Results       Component                Value               Date                       A1C                      14.1                06/14/2017              If an urgent visit is needed or A1C is above 12, Care Team to call the Diabetes Education Team at [phone] or send an In Basket message to the Diabetes Education Pool (P DIAB ED-PATIENT CARE).    Diabetes Education Topics: Comprehensive Knowledge Assessment and Instruction    Special  Educational Needs Requiring Individual DSMT: Intepreter needed for [place] (language)       MEDICAL NUTRITION THERAPY (MNT) for Diabetes    Medical Nutrition Therapy with a Registered Dietitian can be provided in coordination with Diabetes Self-Management Training to assist in achieving optimal diabetes management.    MNT Type and Hours: New diagnosis: Initial MNT - 3 hours                       Medicare will cover: 3 hours initial MNT in 12 month period after first visit, plus 2 hours of follow-up MNT annually    Please be aware that coverage of these services is subject to the terms and limitations of your health insurance plan.  Call member services at your health plan to determine Diabetes Self-Management Training benefits and ask which blood glucose monitor brands are covered by your plan.      Please bring the following with you to your appointment:    (1)  List of current medications   (2)  List of Blood Glucose Monitor brands that are covered by your insurance plan  (3)  Blood Glucose Monitor and log book  (4)   Food records for the 3 days prior to your visit    The Certified Diabetes Educator may make diabetes medication adjustments per the CDE Protocol and Collaborative Practice Agreement.            ENDOCRINOLOGY ADULT REFERRAL       Your provider has referred you to: FM[facility]: [facility] [place] [phone]   [email or web address]      Please be aware that coverage of these services is subject to the terms and limitations of your health insurance plan.  Call member services at your health plan with any benefit or coverage questions.      Please bring the following to your appointment:    >>   Any x-rays, CTs or MRIs which have been performed.  Contact the facility where they were done to arrange for  prior to your scheduled appointment.    >>   List of current medications   >>   This referral request   >>   Any documents/labs given to you for this referral                 "  Who to contact     If you have questions or need follow up information about today's clinic visit or your schedule please contact [facility] directly at [phone].  Normal or non-critical lab and imaging results will be communicated to you by MyChart, letter or phone within 4 business days after the clinic has received the results. If you do not hear from us within 7 days, please contact the clinic through [facility] or phone. If you have a critical or abnormal lab result, we will notify you by phone as soon as possible.  Submit refill requests through Expert [facility] or call your pharmacy and they will forward the refill request to us. Please allow 3 business days for your refill to be completed.          Additional Information About Your Visit        Expert TA Information     Expert TA lets you send messages to your doctor, view your test results, renew your prescriptions, schedule appointments and more. To sign up, go to [email or web address] . Click on \"Log in\" on the left side of the screen, which will take you to the Welcome page. Then click on \"Sign up Now\" on the right side of the page.     You will be asked to enter the access code listed below, as well as some personal information. Please follow the directions to create your username and password.     Your access code is: [email or web address]  Expires: 9/15/2017  5:58 PM     Your access code will  in 90 days. If you need help or a new code, please call your [facility] clinic or [phone].        Care EveryWhere ID     This is your Care EveryWhere ID. This could be used by other organizations to access your [facility] medical records  [id number]        Your Vitals Were     Pulse Temperature Height Pulse Oximetry BMI (Body Mass Index)       76 98.1  F (36.7  C) (Oral) 5' 3\" (1.6 m) 100% 26.13 kg/m2        Blood Pressure from Last 3 Encounters:   17 100/60   17 115/71    Weight from Last 3 Encounters:   17 147 lb 8 oz (66.9 kg)   17 " 146 lb 1.6 oz (66.3 kg)              We Performed the Following     DIABETES EDUCATOR REFERRAL     ENDOCRINOLOGY ADULT REFERRAL          Today's Medication Changes          These changes are accurate as of: 6/26/17  7:26 PM.  If you have any questions, ask your nurse or doctor.               These medicines have changed or have updated prescriptions.        Dose/Directions    LANTUS SOLOSTAR 100 UNIT/ML injection   This may have changed:  how much to take   Generic drug:  insulin glargine   Changed by:  Hedy Faith MD        Dose:  14 Units   Inject 14 Units Subcutaneous At Bedtime   Quantity:  15 mL   Refills:  1                Primary Care Provider    Physician No Ref-Primary       No address on file        Equal Access to Services     [facility]: Hadii aad shahriar hadaubree Sobessie, waaxda brittany, qaybta kaalmada miladis, emily wray . So [facility] [phone].    ATENCIÓN: Si habla español, tiene a [place] disposición servicios gratuitos de asistencia lingüística. [facility] [phone].    We comply with applicable federal civil rights laws and [place] laws. We do not discriminate on the basis of race, color, national origin, age, disability sex, sexual orientation or gender identity.            Thank you!     Thank you for choosing [facility]  for your care. Our goal is always to provide you with excellent care. Hearing back from our patients is one way we can continue to improve our services. Please take a few minutes to complete the written survey that you may receive in the mail after your visit with us. Thank you!             Your Updated Medication List - Protect others around you: Learn how to safely use, store and throw away your medicines at www.disposemymeds.org.          This list is accurate as of: 6/26/17  7:26 PM.  Always use your most recent med list.                   Brand Name Dispense Instructions for use Diagnosis    B-D U/F 31G X 8 MM   Generic  drug:  insulin pen needle      U UTD. PT IS USING FIVE TIMES PER DAY        * insulin aspart 100 UNIT/ML injection    NovoLOG FLEXPEN    30 mL    Inject 1-5 Units Subcutaneous At Bedtime No insulin for BG < 200, 200-250 give 1 u, 251-300: 3 u; 301-350 4 u and notify clinic    Diabetic ketoacidosis without coma associated with other specified diabetes mellitus (H)       * insulin aspart 100 UNIT/ML injection    NovoLOG PEN    3 mL    Inject 1-7 Units Subcutaneous 3 times daily (with meals) BS <150: no insulin 151-200: 1 u; 201-250: 3 units; 251-300: 4 u; 301-350: 6 u,  > 350 give 7 units and call clinic    Diabetic ketoacidosis without coma associated with other specified diabetes mellitus (H)       LANTUS SOLOSTAR 100 UNIT/ML injection   Generic drug:  insulin glargine     15 mL    Inject 14 Units Subcutaneous At Bedtime        * Notice:  This list has 2 medication(s) that are the same as other medications prescribed for you. Read the directions carefully, and ask your doctor or other care provider to review them with you.

## 2017-06-26 NOTE — NURSING NOTE
"Chief Complaint   Patient presents with     ER F/U     Establish Care       Initial /60 (BP Location: Right arm, Patient Position: Chair)  Pulse 76  Temp 98.1  F (36.7  C) (Oral)  Ht 5' 3\" (1.6 m)  Wt 147 lb 8 oz (66.9 kg)  SpO2 100%  BMI 26.13 kg/m2 Estimated body mass index is 26.13 kg/(m^2) as calculated from the following:    Height as of this encounter: 5' 3\" (1.6 m).    Weight as of this encounter: 147 lb 8 oz (66.9 kg).  Medication Reconciliation: complete   Veda Monroy MA      "

## 2017-06-26 NOTE — PROGRESS NOTES
SUBJECTIVE:                                                    Karely Jacobo is a 21 year old female who presents to clinic today for the following health issues:      ED/UC Followup:    Facility:  [facility]  Date of visit: 6/14/17   Reason for visit: New Diabetes complicated by DKA  Current Status: ok doing fine     Pt seen with  today. She was hospitalized for new diagnosis of type 1 diabetes and DKA. Sugars have been improving somewhat. Doing ok with injections and monitoring glucose. Blood sugars in the morning 250-320, noon 130-490, evening 290-433. No lows. Has changed diet and eating less carbs. Taking Lantus 12 units at bedtime and using only sliding scale at meals. Is not doing carb counting or taking a set amount with meals. Was not set up with endocrinology or diabetes educator on discharge.       Vaginal Bleeding (Dysmenorrhea)      Onset: 1 month ago-May 18th  Duration of bleeding episodes: 18 days betwen  Frequency between periods:  June 4-4 days  and June 20th- regular period 5 days   Describe bleeding/flow:   Clots: normal bleeding like regular   Number of pads/hour: 2 in a day  Cramping: no cramping    Intensity:  Normal bleeding     Accompanying signs and symptoms: none     History (similar episodes/previous evaluation): None    Precipitating or alleviating factors: None    Therapies tried and outcome: none     Pt very concerned that her period is off. Had menses with only 18 days between. Reports there is no possibility she is pregnant. No new partners. Typically is very regular.     Reviewed and updated as needed this visit by clinical staff  Tobacco  Allergies  Meds  Problems  Med Hx  Surg Hx  Fam Hx  Soc Hx        Reviewed and updated as needed this visit by Provider  Allergies  Meds  Problems         -------------------------------------    Problem list and histories reviewed & adjusted, as indicated.  Additional history: as  "documented    ROS:  Constitutional, HEENT, cardiovascular, pulmonary, GI, , musculoskeletal, neuro, skin, endocrine and psych systems are negative, except as otherwise noted.    Problem list, Medication list, Allergies, and Medical/Social/Surgical histories reviewed in [facility] and updated as appropriate.    OBJECTIVE:                                                    /60 (BP Location: Right arm, Patient Position: Chair)  Pulse 76  Temp 98.1  F (36.7  C) (Oral)  Ht 5' 3\" (1.6 m)  Wt 147 lb 8 oz (66.9 kg)  SpO2 100%  BMI 26.13 kg/m2   Body mass index is 26.13 kg/(m^2).  General Appearance: healthy, alert and no distress  Eyes:   no discharge, erythema.  Normal pupils.  Respiratory: lungs clear to auscultation - no rales, rhonchi or wheezes.  Cardiovascular: regular rate and rhythm, normal S1 S2, no S3 or S4 and no murmur, click or rub.  No peripheral edema.  Skin: no rashes or lesions.  Well perfused and normal turgor.    Diagnostic Test Results:  Reviewed labs from hospital. Hgb a1c 14.1% and KELSY ab positive     ASSESSMENT/PLAN:                                                      (E10.65) Type 1 diabetes mellitus with hyperglycemia (H)  (primary encounter diagnosis)  Comment: new diagnosis  Plan: DIABETES EDUCATOR REFERRAL, ENDOCRINOLOGY ADULT        REFERRAL  - discussed at length diagnosis of diabetes and possible complications  - discussed at length role of diabetes educator and endocrinology in care and recommended she get these scheduled   - increase Lantus to 14 units, but will need a better plan for meal insulin. Should be on set amount or ideally learn to count carbs  - will need fasting labs in future, can plan to check with repeat a1c in 3 months  - f/u with me in 1 month if has not yet been able to schedule with endo  - encouraged to call with any questions.    (N92.1) Metrorrhagia  Comment: this is likely due to stress and very high sugars. Had negative UPT and TSH in hospital  Plan:   - f/u " if does not resolve over next couple of months    Follow up with Provider - 1 month    A total of 45 minutes was spent with Karely in clinic today, of which >50% was spent counseling or in coordination of care regarding new diagnosis of type 1 diabetes and metrorrhagia.      [facility]

## 2017-06-27 NOTE — PATIENT INSTRUCTIONS
1. Increase Lantus (glargine) insulin to 14 units in evening  2. Continue novolog insulin as taking  3. You will be contacted to set up an appointment with the diabetes educator  4. Schedule an appointment with Dr. Ballard (Endocrinologist) - [phone]  5. Follow-up with me in 1 month if not going to be able to see endocrinologist for 2 or 3 months

## 2017-07-05 DIAGNOSIS — E10.65 TYPE 1 DIABETES MELLITUS WITH HYPERGLYCEMIA (H): Primary | ICD-10-CM

## 2017-07-05 NOTE — TELEPHONE ENCOUNTER
Family member calling that patient needs test strips for Accucheck Aby plus 3-4 times a day.   Routing refill request to provider for review/approval because:  Drug not active on patient's medication list    Pauline Bryant RN

## 2017-07-25 ENCOUNTER — OFFICE VISIT (OUTPATIENT)
Dept: ENDOCRINOLOGY | Facility: CLINIC | Age: 22
End: 2017-07-25
Attending: INTERNAL MEDICINE
Payer: COMMERCIAL

## 2017-07-25 VITALS
WEIGHT: 143.6 LBS | DIASTOLIC BLOOD PRESSURE: 54 MMHG | HEIGHT: 63 IN | OXYGEN SATURATION: 97 % | HEART RATE: 74 BPM | TEMPERATURE: 98.4 F | BODY MASS INDEX: 25.45 KG/M2 | SYSTOLIC BLOOD PRESSURE: 98 MMHG

## 2017-07-25 DIAGNOSIS — E10.65 TYPE 1 DIABETES MELLITUS WITH HYPERGLYCEMIA (H): ICD-10-CM

## 2017-07-25 DIAGNOSIS — E08.10 DIABETIC KETOACIDOSIS WITHOUT COMA ASSOCIATED WITH DIABETES MELLITUS DUE TO UNDERLYING CONDITION (H): ICD-10-CM

## 2017-07-25 DIAGNOSIS — E10.65 TYPE 1 DIABETES MELLITUS WITH HYPERGLYCEMIA (H): Primary | ICD-10-CM

## 2017-07-25 LAB — HBA1C MFR BLD: 11.5 % (ref 4.3–6)

## 2017-07-25 PROCEDURE — 36415 COLL VENOUS BLD VENIPUNCTURE: CPT | Performed by: INTERNAL MEDICINE

## 2017-07-25 PROCEDURE — 82043 UR ALBUMIN QUANTITATIVE: CPT | Performed by: INTERNAL MEDICINE

## 2017-07-25 PROCEDURE — 80061 LIPID PANEL: CPT | Performed by: INTERNAL MEDICINE

## 2017-07-25 PROCEDURE — 99204 OFFICE O/P NEW MOD 45 MIN: CPT | Performed by: INTERNAL MEDICINE

## 2017-07-25 PROCEDURE — 83036 HEMOGLOBIN GLYCOSYLATED A1C: CPT | Performed by: INTERNAL MEDICINE

## 2017-07-25 PROCEDURE — 99207 C FOOT EXAM  NO CHARGE: CPT | Performed by: INTERNAL MEDICINE

## 2017-07-25 NOTE — NURSING NOTE
"ENDOCRINOLOGY INTAKE FORM    Patient Name:  Karely Jacobo  :  1995    Is patient Diabetic?   Yes: type 1   Does patient have non-diabetic or other endocrine issues?  No    Vitals: BP 98/54 (BP Location: Right arm, Patient Position: Chair, Cuff Size: Adult Large)  Pulse 74  Temp 98.4  F (36.9  C) (Oral)  Ht 1.6 m (5' 3\")  Wt 65.1 kg (143 lb 9.6 oz)  SpO2 97%  BMI 25.44 kg/m2  BMI= Body mass index is 25.44 kg/(m^2).    Flu vaccine:  No  Pneumonia vaccine:  No    Smoking and Alcohol use:  Social History   Substance Use Topics     Smoking status: Never Smoker     Smokeless tobacco: Never Used     Alcohol use No       Foot Exam: No  Eye Exam:  No  Dental Exam:  completed  Aspirin Use:  No    Lab Results   Component Value Date    A1C 14.1 2017       No results found for: MICROL  No results found for: MICROALBUMIN      Staff Signature:  Perlita Torres CMA ([facility])    Chief Complaint   Patient presents with     Referral     Dr Faith Type 1        Initial BP 98/54 (BP Location: Right arm, Patient Position: Chair, Cuff Size: Adult Large)  Pulse 74  Temp 98.4  F (36.9  C) (Oral)  Ht 1.6 m (5' 3\")  Wt 65.1 kg (143 lb 9.6 oz)  SpO2 97%  BMI 25.44 kg/m2 Estimated body mass index is 25.44 kg/(m^2) as calculated from the following:    Height as of this encounter: 1.6 m (5' 3\").    Weight as of this encounter: 65.1 kg (143 lb 9.6 oz).  Medication Reconciliation: complete        "

## 2017-07-25 NOTE — MR AVS SNAPSHOT
After Visit Summary   2017    Karely Jacobo    MRN: [id number]           Patient Information     Date Of Birth          1995        Visit Information        Provider Department      2017 2:30 PM Natalya Ballard MD; MULTILINGUAL WORD [facility]        Today's Diagnoses     Type 1 diabetes mellitus with hyperglycemia (H)    -  1    Diabetic ketoacidosis without coma associated with diabetes mellitus due to underlying condition (H)          Care Instructions    [facility]   Dr Ballard, Endocrinology Department      [facility]   [address]  [place] [address]  Appointment [phone]  Fax: [phone]  [place]: Monday and Tuesday         [place] [address] [facility] [address]  [place] [address]  Appointment [phone]  Fax: [phone]  [place]: Wednesday and Thursday          Please arrive 30 minutes before your scheduled appointment.   First go to the main floor lab suit  for previsit A1c lab appointment and then come upstairs and get checked in with  for clinic visit.  This will allow for your blood glucose meter/insulin pump to be downloaded and glycosylated hemoglobin (A1c) to be obtained prior to your appointment.    Increase lantus by 2 units every 3-4 days ill you get at 20 units/day  Continue novolog at current dose    Your provider has referred you to Diabetes Education: For all [facility]:  Phone [phone]; Fax [phone]  Please call and make the appointment.    Recommend checking blood sugars before meals and at bedtime.    If Blood glucose are low more often-> 2-3 times/week- give us a call.  The patient is advised to Make better food choices: reduce carbs, Reduce portion size, weight loss and exercise 3-4 times a week.  Discussed hypoglycemia signs and symptoms as well as management in detail.              "   Follow-ups after your visit        Future tests that were ordered for you today     Open Future Orders        Priority Expected Expires Ordered    Hemoglobin A1c Routine  2018    Albumin Random Urine Quantitative Routine  2018    Lipid Profile Routine  2018    Creatinine Routine  2018            Who to contact     If you have questions or need follow up information about today's clinic visit or your schedule please contact [facility] directly at [phone].  Normal or non-critical lab and imaging results will be communicated to you by HumanAPIhart, letter or phone within 4 business days after the clinic has received the results. If you do not hear from us within 7 days, please contact the clinic through Crashmobt or phone. If you have a critical or abnormal lab result, we will notify you by phone as soon as possible.  Submit refill requests through Art Qualified or call your pharmacy and they will forward the refill request to us. Please allow 3 business days for your refill to be completed.          Additional Information About Your Visit        [email or web address] Information     Art Qualified lets you send messages to your doctor, view your test results, renew your prescriptions, schedule appointments and more. To sign up, go to [email or web address] . Click on \"Log in\" on the left side of the screen, which will take you to the Welcome page. Then click on \"Sign up Now\" on the right side of the page.     You will be asked to enter the access code listed below, as well as some personal information. Please follow the directions to create your username and password.     Your access code is: [email or web address]  Expires: 9/15/2017  5:58 PM     Your access code will  in 90 days. If you need help or a new code, please call your [facility] or [phone].        Care EveryWhere ID     This is your Care EveryWhere ID. This could be used by other organizations to access " "your [facility] medical records  [id number]        Your Vitals Were     Pulse Temperature Height Pulse Oximetry BMI (Body Mass Index)       74 98.4  F (36.9  C) (Oral) 1.6 m (5' 3\") 97% 25.44 kg/m2        Blood Pressure from Last 3 Encounters:   07/25/17 98/54   06/26/17 100/60   06/17/17 115/71    Weight from Last 3 Encounters:   07/25/17 65.1 kg (143 lb 9.6 oz)   06/26/17 66.9 kg (147 lb 8 oz)   06/17/17 66.3 kg (146 lb 1.6 oz)              We Performed the Following     FOOT EXAM          Today's Medication Changes          These changes are accurate as of: 7/25/17  3:29 PM.  If you have any questions, ask your nurse or doctor.               These medicines have changed or have updated prescriptions.        Dose/Directions    insulin glargine 100 UNIT/ML injection   Commonly known as:  LANTUS SOLOSTAR   This may have changed:  how much to take   Used for:  Type 1 diabetes mellitus with hyperglycemia (H)   Changed by:  Natalya Ballard MD        Dose:  20 Units   Inject 20 Units Subcutaneous At Bedtime   Quantity:  15 mL   Refills:  1            Where to get your medicines      Some of these will need a paper prescription and others can be bought over the counter.  Ask your nurse if you have questions.     You don't need a prescription for these medications     insulin glargine 100 UNIT/ML injection                Primary Care Provider Office Phone # Fax #    Hedy Da Faith [phone]       [place] [facility] [address] [place] [address]        Equal Access to Services     [facility] AH: Hadii rita ku hadasho Soomaali, waaxda luqadaha, qaybta kaalmada adeegjennifer, waxay idijamari ruiz. So [facility] [phone].    ATENCIÓN: Si habla español, tiene a [place] disposición servicios gratuitos de asistencia lingüística. Llame al [phone].    We comply with applicable federal civil rights laws and [place] laws. We do not discriminate on the basis of race, " color, national origin, age, disability sex, sexual orientation or gender identity.            Thank you!     Thank you for choosing [facility]  for your care. Our goal is always to provide you with excellent care. Hearing back from our patients is one way we can continue to improve our services. Please take a few minutes to complete the written survey that you may receive in the mail after your visit with us. Thank you!             Your Updated Medication List - Protect others around you: Learn how to safely use, store and throw away your medicines at www.disposemymeds.org.          This list is accurate as of: 7/25/17  3:29 PM.  Always use your most recent med list.                   Brand Name Dispense Instructions for use Diagnosis    B-D U/F 31G X 8 MM   Generic drug:  insulin pen needle      U UTD. PT IS USING FIVE TIMES PER DAY        blood glucose monitoring test strip    no brand specified    100 strip    Use to test blood sugars 3-4 times daily or as directed Accu-check Aby plus    Type 1 diabetes mellitus with hyperglycemia (H)       * insulin aspart 100 UNIT/ML injection    NovoLOG FLEXPEN    30 mL    Inject 1-5 Units Subcutaneous At Bedtime No insulin for BG < 200, 200-250 give 1 u, 251-300: 3 u; 301-350 4 u and notify clinic    Diabetic ketoacidosis without coma associated with other specified diabetes mellitus (H)       * insulin aspart 100 UNIT/ML injection    NovoLOG PEN    3 mL    Inject 1-7 Units Subcutaneous 3 times daily (with meals) BS <150: no insulin 151-200: 1 u; 201-250: 3 units; 251-300: 4 u; 301-350: 6 u,  > 350 give 7 units and call clinic    Diabetic ketoacidosis without coma associated with other specified diabetes mellitus (H)       insulin glargine 100 UNIT/ML injection    LANTUS SOLOSTAR    15 mL    Inject 20 Units Subcutaneous At Bedtime    Type 1 diabetes mellitus with hyperglycemia (H)       * Notice:  This list has 2 medication(s) that are the same as other  medications prescribed for you. Read the directions carefully, and ask your doctor or other care provider to review them with you.

## 2017-07-25 NOTE — PATIENT INSTRUCTIONS
[facility] [place] & [place] locations   Dr Ballard, Endocrinology Department      [facility] [place]   [address]  [place] [address]  Appointment [phone]  Fax: [phone]  [place]: Monday and Tuesday         [facility]   [address]  [place] [address]  Appointment [phone]  Fax: [phone]  [place]: Wednesday and Thursday          Please arrive 30 minutes before your scheduled appointment.   First go to the main floor lab suit  for previsit A1c lab appointment and then come upstairs and get checked in with  for clinic visit.  This will allow for your blood glucose meter/insulin pump to be downloaded and glycosylated hemoglobin (A1c) to be obtained prior to your appointment.    Increase lantus by 2 units every 3-4 days ill you get at 20 units/day  Continue novolog at current dose    Your provider has referred you to Diabetes Education: For all [facility]:  Phone [phone]; Fax [phone]  Please call and make the appointment.    Recommend checking blood sugars before meals and at bedtime.    If Blood glucose are low more often-> 2-3 times/week- give us a call.  The patient is advised to Make better food choices: reduce carbs, Reduce portion size, weight loss and exercise 3-4 times a week.  Discussed hypoglycemia signs and symptoms as well as management in detail.

## 2017-07-25 NOTE — PROGRESS NOTES
Endocrinology Clinic Note:  Name: Karely Jacobo  Seen at the request of Hedy Faith for Diabetes.  HPI:  Karely Jacobo is a 21 year old female who presents for the evaluation/management of type 1 diabetes.  She was admitted in the hospital with diabetic ketoacidosis and new diagnosis of type 1 diabetes in June 2017.  KELSY positive.  During hospitalization she was initiated on insulin drip and was discharged on Lantus and NovoLog injections.  She reports compliance.  She will be going to [facility] next month and [place] back to [place] in [place]  I encouraged her to establish care over there for ongoing treatment and evaluation of diabetes.     1. Type 1 DM:  Orginally diagnosed at the age of: 21  Hospitalization for DKA: June 2017  Current Regimen: Lantus 14 units at night and NovoLog sliding scale ( with meals 1-50 >150) ( at bedtime 1-50 >200)  BS checks: Three times daily.  Fasting, before lunch and before dinner  Average Meter Download: Patient did not bring glucometer. Without Blood sugar data it is difficult to make adjustment to medical regimen.   Has handwritten blood sugar records and blood sugars are ranging in 200-3 100s most of the time.  No major episodes of hypoglycemia.  Exercise: Minimal  Last A1c: 14.1% at the time of diagnosis  Symptoms of hypoglycemia (low blood sugar): Present  Diabetic eye exam within the last year: Due for eye examination  Fixed meal pattern: Yes  Patient counting carbs: No  Using PUMP:  no      DM Complications: No  Nephropathy:  Retinopathy:  Neuropathy:  Microalbuminuria:  CAD/PAD:  Gastroparesis:  Hypoglycemia unawareness:     2. Hypertension: Blood Pressure today:   BP Readings from Last 3 Encounters:   07/25/17 98/54   06/26/17 100/60   06/17/17 115/71    not on medication    3. Hyperlipidemia:  Not on medication    PMH/PSH:  Past Medical History:   Diagnosis Date     Gestational hypertension     resolved after childbirth     Past Surgical History:   Procedure  "Laterality Date      SECTION       Family Hx:  Family History   Problem Relation Age of Onset     DIABETES Father      Type 2     DIABETES Paternal Aunt        Social Hx:  Social History     Social History     Marital status:      Spouse name: N/A     Number of children: N/A     Years of education: N/A     Occupational History     Not on file.     Social History Main Topics     Smoking status: Never Smoker     Smokeless tobacco: Never Used     Alcohol use No     Drug use: No     Sexual activity: Yes     Partners: Male     Birth control/ protection: None     Other Topics Concern     Not on file     Social History Narrative          MEDICATIONS:  has a current medication list which includes the following prescription(s): blood glucose monitoring, b-d u/f, insulin glargine, insulin aspart, and insulin aspart.    ROS     ROS: 10 point ROS neg other than the symptoms noted above in the HPI.    Physical Exam   VS: BP 98/54 (BP Location: Right arm, Patient Position: Chair, Cuff Size: Adult Large)  Pulse 74  Temp 98.4  F (36.9  C) (Oral)  Ht 1.6 m (5' 3\")  Wt 65.1 kg (143 lb 9.6 oz)  SpO2 97%  BMI 25.44 kg/m2  GENERAL: AXOX3, NAD, well dressed, answering questions appropriately, appears stated age.  HEENT: No exopthalmous, no proptosis, EOMI, no lig lag, no retraction  NECK: Thyroid normal in size, non tender, no nodules were palpated.  CV: RRR, no rubs, gallops, no murmurs  LUNGS: CTAB, no wheezes, rales, or ronchi  ABDOMEN: soft, nontender, nondistended, +BS, no organomegaly  EXTREMITIES: no edema, +pulses, no rashes, no lesions  NEUROLOGY: CN grossly intact, + monofilament, + DTR upper and lower extremity, no tremors  DM FOOT EXAM: normal DP and PT pulses, no trophic changes or ulcerative lesions, normal sensory exam and normal monofilament exam.   MSK: grossly intact  SKIN: no rashes, no lesions  PSYCH: normal affect and mood      LABS:  A1c:  Lab Results   Component Value Date    A1C 14.1 " 06/14/2017         BMP:  Last Basic Metabolic Panel:  Lab Results   Component Value Date     06/17/2017      Lab Results   Component Value Date    POTASSIUM 3.5 06/17/2017     Lab Results   Component Value Date    CHLORIDE 108 06/17/2017     Lab Results   Component Value Date    KEYUR 8.1 06/17/2017     Lab Results   Component Value Date    CO2 23 06/17/2017     Lab Results   Component Value Date    BUN 2 06/17/2017     Lab Results   Component Value Date    CR 0.39 06/17/2017     Lab Results   Component Value Date     06/17/2017         Urine Micro:  No results found for: MICROL  No results found for: MICROALBUMIN      LFTs/Lipids:  No results for input(s): CHOL, HDL, LDL, TRIG, CHOLHDLRATIO in the last 62055 hours.    Liver Function Studies -   Recent Labs   Lab Test  06/14/17   1856   PROTTOTAL  8.3   ALBUMIN  4.3   BILITOTAL  0.4   ALKPHOS  220*   AST  6   ALT  15         TFTs:  Lab Results   Component Value Date    TSH 1.70 06/14/2017           Blood Glucose and pump data/ Meter reviewed.     All pertinent notes, labs, and images personally reviewed by me.     A/P  Ms.Anusha Jacobo is a 21 year old here for the evaluation/management of diabetes:    1. DM1 - (KELSY +): Under Poor control.  A1c 14.1%.  Two diagnosis of type 1 diabetes.  No known complications from diabetes.  Blood sugars are ranging in 200-300 range.  No major episodes of hypoglycemia.  -- Increase Lantus to 20 units gradually  -- Continue NovoLog at current scale.  Recommend to take NovoLog based on carbohydrate counting.  She needs to learn how to carbohydrate count  -- Diabetic educator referral for carbohydrate counting education  -- Consider metformin in next visit  -- Discussed insulin pump as a future step if BG under control and once she is comfortable with carb counting  -- not planning pregnancy. Discussed importance of strict BG control before conception and during pregnancy.      Recommend checking blood sugars before meals  and at bedtime.    If Blood glucose are low more often-> 2-3 times/week- give us a call.  The patient is advised to Make better food choices: reduce carbs, Reduce portion size, weight loss and exercise 3-4 times a week.  Discussed hypoglycemia signs and symptoms as well as management in detail.    There is some variability among people, most will usually develop symptoms suggestive of hypoglycemia when blood glucose levels are lowered to the mid 60's. The first set of symptoms are called adrenergic. Patients may experience any of the following nervousness, sweating, intense hunger, trembling, weakness, palpitations, and difficulty speaking. When BS fall below 50 the patient is unable to talk and take oral therapy.  Would recommend Glucagon emergency kit for the patient and education for family and friends around the patient.   The acute management of hypoglycemia involves the rapid delivery of a source of easily absorbed sugar. Regular soda, juice, lifesavers, table sugar, are good options. 15 grams of glucose is the dose that is given, followed by an assessment of symptoms and a blood glucose check if possible. If after 10 minutes there is no improvement, another 10-15 grams should be given. This can be repeated up to three times. The equivalency of 10-15 grams of glucose (approximate servings) are: Four lifesavers, 4 teaspoons of sugar, or 1/2 cup or 4 oz of juice or regular pop.    2. Hypertension - Under Good  control. Not on medication       3. Hyperlipidemia - No data to review. Not on medication  -- check lipid panel once BG under better control and stable    4. Prevention  Flu Shot- NA  Pneumovax- NA  Opthalmology- due  ASA- NA 2/2 to age  Smoking- NA      Follow-up:  After she is back from [place].    Natalya Ballard M.D  Endocrinology  [facility]  CC: Hedy Faith    More than 50% of face to face time spent with Ms. Jacobo on counseling / coordinating her care.    All  questions were answered.  The patient indicates understanding of the above issues and agrees with the plan set forth.     Disclaimer: This note consists of symbols derived from keyboarding, dictation and/or voice recognition software. As a result, there may be errors in the script that have gone undetected. Please consider this when interpreting information found in this chart.

## 2017-07-25 NOTE — LETTER
[facility]  [address]  [place] [address]                  [phone]   July 26, 2017    Karely Jacobo  [address]   [place] [address]      Dear Karely,    Here is a summary of your recent test results:    1. Your urine protein is normal.     2. Your cholesterol looks good. Your total cholesterol is 159 (normal is 200 or less). Your LDL or bad cholesterol is 92 (normal is less than 100). Your HDL or good cholesterol is 45 (normal is 50 or higher). You should recheck your cholesterol in 1 year.     3. Your hemoglobin a1c (average blood sugar over the past 3 months) is down to 11.5 from 14.1% - your goal is at least less than 7%.     Your test results are enclosed.      Please contact me if you have any questions.           Thank you very much for choosing [facility]    Best regards,    Hedy Faith MD        Results for orders placed or performed in visit on 07/25/17   Hemoglobin A1c   Result Value Ref Range    Hemoglobin A1C 11.5 (H) 4.3 - 6.0 %   Albumin Random Urine Quantitative   Result Value Ref Range    Creatinine Urine 21 mg/dL    Albumin Urine mg/L <5 mg/L    Albumin Urine mg/g Cr Unable to calculate due to low value 0 - 25 mg/g Cr   Lipid panel reflex to direct LDL   Result Value Ref Range    Cholesterol 159 <200 mg/dL    Triglycerides 111 <150 mg/dL    HDL Cholesterol 45 (L) >49 mg/dL    LDL Cholesterol Calculated 92 <100 mg/dL    Non HDL Cholesterol 114 <130 mg/dL

## 2017-07-26 LAB
CHOLEST SERPL-MCNC: 159 MG/DL
CREAT UR-MCNC: 21 MG/DL
HDLC SERPL-MCNC: 45 MG/DL
LDLC SERPL CALC-MCNC: 92 MG/DL
MICROALBUMIN UR-MCNC: <5 MG/L
MICROALBUMIN/CREAT UR: NORMAL MG/G CR (ref 0–25)
NONHDLC SERPL-MCNC: 114 MG/DL
TRIGL SERPL-MCNC: 111 MG/DL

## 2018-01-21 ENCOUNTER — HEALTH MAINTENANCE LETTER (OUTPATIENT)
Age: 23
End: 2018-01-21

## 2018-02-01 ENCOUNTER — TELEPHONE (OUTPATIENT)
Dept: ENDOCRINOLOGY | Facility: CLINIC | Age: 23
End: 2018-02-01

## 2018-02-01 NOTE — TELEPHONE ENCOUNTER
Panel Management Review      Patient has the following on her problem list:     Diabetes    ASA: Passed    Last A1C  Lab Results   Component Value Date    A1C 11.5 07/25/2017    A1C 14.1 06/14/2017     A1C tested: FAILED    Last LDL:    Lab Results   Component Value Date    CHOL 159 07/25/2017     Lab Results   Component Value Date    HDL 45 07/25/2017     Lab Results   Component Value Date    LDL 92 07/25/2017     Lab Results   Component Value Date    TRIG 111 07/25/2017     No results found for: CHOLHDLRATIO  Lab Results   Component Value Date    NHDL 114 07/25/2017       Is the patient on a Statin? NO             Is the patient on Aspirin? NO        Last three blood pressure readings:  BP Readings from Last 3 Encounters:   07/25/17 98/54   06/26/17 100/60   06/17/17 115/71       Date of last diabetes office visit: 7/25/17     Tobacco History:     History   Smoking Status     Never Smoker   Smokeless Tobacco     Never Used           Composite cancer screening  Chart review shows that this patient is due/due soon for the following Pap Smear  Summary:    Patient is due/failing the following:   FOLLOW UP and PAP    Action needed:   Patient needs office visit for dm and pcp.    Type of outreach:    Sent letter.    Questions for provider review:    None                                                                                                                                    Perlita Torres CMA ([facility])       Chart routed to closed .      '

## 2018-02-01 NOTE — LETTER
February 1, 2018    Karely Jacobo  [address]   [place] [address]    Dear Karely    I care about your health and have reviewed your health plan. I have reviewed your medical conditions, medication list, and lab results and am making recommendations based on this review, to better manage your health.    You are in particular need of attention regarding:  -Diabetes  -Cervical Cancer Screening    I am recommending that you:  -schedule a FOLLOWUP OFFICE APPOINTMENT with me.      -schedule a PAP SMEAR EXAM which is due.This is followed up with a PRIMARY PROVIDER     Please disregard this reminder if you have had this exam elsewhere within the last year.  It would be helpful for us to have a copy of your recent pap smear report in our file so that we can best coordinate your care.    If you are under/uninsured, we recommend you contact the [facility]. They offer pap smears at no charge or on a sliding fee charge. You can schedule with them at [phone]. Please have them send us the results.      Health Maintenance Due   Topic Date Due     CHLAMYDIA SCREENING  1995     EYE EXAM Q1 YEAR  08/06/1994     PNEUMOVAX 1X HI RISK PATIENT < 65 (NO IB MSG)  08/06/1995     HPV IMMUNIZATION (1 of 3 - Female 3 Dose Series) 08/06/2004     TETANUS IMMUNIZATION (SYSTEM ASSIGNED)  08/06/2011     PAP SCREENING Q3 YR (SYSTEM ASSIGNED)  08/06/2014     INFLUENZA VACCINE (SYSTEM ASSIGNED)  09/01/2017     A1C Q3 MO  10/25/2017         Please call us at [phone] ([place]),  [phone] ([place]) or use [employer] to address the above recommendations.     Thank you for trusting [facility] and we appreciate the opportunity to serve you.  We look forward to supporting your healthcare needs in the future.    Healthy Regards,    Natalya Ballard MD  Endocrinology  [place]/[facility]  February 1, 2018

## 2020-03-11 ENCOUNTER — HEALTH MAINTENANCE LETTER (OUTPATIENT)
Age: 25
End: 2020-03-11

## 2020-12-27 ENCOUNTER — HEALTH MAINTENANCE LETTER (OUTPATIENT)
Age: 25
End: 2020-12-27

## 2021-04-24 ENCOUNTER — HEALTH MAINTENANCE LETTER (OUTPATIENT)
Age: 26
End: 2021-04-24

## 2021-08-14 ENCOUNTER — HEALTH MAINTENANCE LETTER (OUTPATIENT)
Age: 26
End: 2021-08-14

## 2021-10-09 ENCOUNTER — HEALTH MAINTENANCE LETTER (OUTPATIENT)
Age: 26
End: 2021-10-09

## 2021-12-04 ENCOUNTER — HEALTH MAINTENANCE LETTER (OUTPATIENT)
Age: 26
End: 2021-12-04

## 2022-02-17 PROBLEM — E11.10 DKA (DIABETIC KETOACIDOSIS) (H): Status: ACTIVE | Noted: 2017-06-14

## 2022-03-26 ENCOUNTER — HEALTH MAINTENANCE LETTER (OUTPATIENT)
Age: 27
End: 2022-03-26

## 2022-05-21 ENCOUNTER — HEALTH MAINTENANCE LETTER (OUTPATIENT)
Age: 27
End: 2022-05-21

## 2022-07-16 ENCOUNTER — HEALTH MAINTENANCE LETTER (OUTPATIENT)
Age: 27
End: 2022-07-16

## 2022-09-17 ENCOUNTER — HEALTH MAINTENANCE LETTER (OUTPATIENT)
Age: 27
End: 2022-09-17

## 2023-01-23 ENCOUNTER — HEALTH MAINTENANCE LETTER (OUTPATIENT)
Age: 28
End: 2023-01-23

## 2023-05-06 ENCOUNTER — HEALTH MAINTENANCE LETTER (OUTPATIENT)
Age: 28
End: 2023-05-06

## 2023-06-03 ENCOUNTER — HEALTH MAINTENANCE LETTER (OUTPATIENT)
Age: 28
End: 2023-06-03

## 2023-10-07 ENCOUNTER — HEALTH MAINTENANCE LETTER (OUTPATIENT)
Age: 28
End: 2023-10-07

## 2024-02-24 ENCOUNTER — HEALTH MAINTENANCE LETTER (OUTPATIENT)
Age: 29
End: 2024-02-24